# Patient Record
Sex: FEMALE | Race: WHITE | NOT HISPANIC OR LATINO | URBAN - METROPOLITAN AREA
[De-identification: names, ages, dates, MRNs, and addresses within clinical notes are randomized per-mention and may not be internally consistent; named-entity substitution may affect disease eponyms.]

---

## 2018-08-28 ENCOUNTER — OUTPATIENT (OUTPATIENT)
Dept: OUTPATIENT SERVICES | Facility: HOSPITAL | Age: 28
LOS: 1 days | Discharge: HOME | End: 2018-08-28

## 2018-09-04 DIAGNOSIS — B00.9 HERPESVIRAL INFECTION, UNSPECIFIED: ICD-10-CM

## 2023-01-23 PROBLEM — Z00.00 ENCOUNTER FOR PREVENTIVE HEALTH EXAMINATION: Status: ACTIVE | Noted: 2023-01-23

## 2023-01-24 ENCOUNTER — APPOINTMENT (OUTPATIENT)
Dept: OBGYN | Facility: CLINIC | Age: 33
End: 2023-01-24
Payer: COMMERCIAL

## 2023-01-24 VITALS
WEIGHT: 161 LBS | BODY MASS INDEX: 28.53 KG/M2 | SYSTOLIC BLOOD PRESSURE: 125 MMHG | HEIGHT: 63 IN | DIASTOLIC BLOOD PRESSURE: 76 MMHG

## 2023-01-24 DIAGNOSIS — Z78.9 OTHER SPECIFIED HEALTH STATUS: ICD-10-CM

## 2023-01-24 DIAGNOSIS — Z83.42 FAMILY HISTORY OF FAMILIAL HYPERCHOLESTEROLEMIA: ICD-10-CM

## 2023-01-24 PROCEDURE — 0501F PRENATAL FLOW SHEET: CPT

## 2023-01-24 PROCEDURE — 36415 COLL VENOUS BLD VENIPUNCTURE: CPT

## 2023-01-24 RX ORDER — ASCORBIC ACID, CHOLECALCIFEROL, .ALPHA.-TOCOPHEROL ACETATE, DL-, PYRIDOXINE, FOLIC ACID, CYANOCOBALAMIN, CALCIUM, FERROUS FUMARATE, MAGNESIUM, DOCONEXENT 85; 200; 10; 25; 1; 12; 140; 27; 45; 300 [IU]/1; [IU]/1; [IU]/1; [IU]/1; MG/1; UG/1; MG/1; MG/1; MG/1; MG/1
27-0.6-0.4-3 CAPSULE, GELATIN COATED ORAL
Qty: 30 | Refills: 8 | Status: ACTIVE | COMMUNITY
Start: 2023-01-24 | End: 1900-01-01

## 2023-01-24 NOTE — HISTORY OF PRESENT ILLNESS
[Currently Active] : currently active [Men] : men [No] : No [FreeTextEntry1] : Patient is a 32 year old who presents after she had a positive home pregnancy test. LMP 12/5/22. She is endorsing occasional nausea and breast tenderness. \par  [HIV Test offered] : HIV Test offered [Syphilis test offered] : Syphilis test offered [Gonorrhea test offered] : Gonorrhea test offered [Chlamydia test offered] : Chlamydia test offered [HPV test offered] : HPV test offered [Hepatitis B test offered] : Hepatitis B test offered [Hepatitis C test offered] : Hepatitis C test offered [N] : Patient denies prior pregnancies

## 2023-01-24 NOTE — PLAN
[FreeTextEntry1] : 32 year old female presenting with amenorrhea\par -f/u PAP and GC/CT done today\par -f/u prenatal blood work drawn today\par -Rx sent for PNV \par -Recommended pt to intake more mi as she c/o nausea \par -RTO 2 weeks for first trimester ultrasound and review of prenatal lab results

## 2023-01-25 LAB
ABO + RH PNL BLD: NORMAL
APPEARANCE: CLEAR
BACTERIA: NEGATIVE
BASOPHILS # BLD AUTO: 0.02 K/UL
BASOPHILS NFR BLD AUTO: 0.3 %
BILIRUBIN URINE: NEGATIVE
BLD GP AB SCN SERPL QL: NORMAL
BLOOD URINE: NEGATIVE
C TRACH RRNA SPEC QL NAA+PROBE: NOT DETECTED
COLOR: NORMAL
EOSINOPHIL # BLD AUTO: 0.02 K/UL
EOSINOPHIL NFR BLD AUTO: 0.3 %
ESTIMATED AVERAGE GLUCOSE: 97 MG/DL
GLUCOSE QUALITATIVE U: NEGATIVE
GLUCOSE SERPL-MCNC: 92 MG/DL
HBA1C MFR BLD HPLC: 5 %
HBV SURFACE AG SER QL: NONREACTIVE
HCT VFR BLD CALC: 38.3 %
HGB BLD-MCNC: 13 G/DL
HIV1+2 AB SPEC QL IA.RAPID: NONREACTIVE
HYALINE CASTS: 0 /LPF
IMM GRANULOCYTES NFR BLD AUTO: 0.2 %
KETONES URINE: NEGATIVE
LEUKOCYTE ESTERASE URINE: NEGATIVE
LYMPHOCYTES # BLD AUTO: 2.14 K/UL
LYMPHOCYTES NFR BLD AUTO: 36 %
MAN DIFF?: NORMAL
MCHC RBC-ENTMCNC: 31.1 PG
MCHC RBC-ENTMCNC: 33.9 GM/DL
MCV RBC AUTO: 91.6 FL
MICROSCOPIC-UA: NORMAL
MONOCYTES # BLD AUTO: 0.49 K/UL
MONOCYTES NFR BLD AUTO: 8.2 %
N GONORRHOEA RRNA SPEC QL NAA+PROBE: NOT DETECTED
NEUTROPHILS # BLD AUTO: 3.26 K/UL
NEUTROPHILS NFR BLD AUTO: 55 %
NITRITE URINE: NEGATIVE
PH URINE: 6.5
PLATELET # BLD AUTO: 250 K/UL
PROTEIN URINE: NEGATIVE
RBC # BLD: 4.18 M/UL
RBC # FLD: 12.1 %
RED BLOOD CELLS URINE: 2 /HPF
SOURCE AMPLIFICATION: NORMAL
SPECIFIC GRAVITY URINE: 1.01
SQUAMOUS EPITHELIAL CELLS: 1 /HPF
UROBILINOGEN URINE: NORMAL
WBC # FLD AUTO: 5.94 K/UL
WHITE BLOOD CELLS URINE: 1 /HPF

## 2023-01-26 LAB
BACTERIA UR CULT: NORMAL
HGB A MFR BLD: 96.9 %
HGB A2 MFR BLD: 2.6 %
HGB F MFR BLD: 0.5 %
HGB FRACT BLD-IMP: NORMAL
HPV 16 E6+E7 MRNA CVX QL NAA+PROBE: NOT DETECTED
HPV HIGH+LOW RISK DNA PNL CVX: NOT DETECTED
HPV18+45 E6+E7 MRNA CVX QL NAA+PROBE: NOT DETECTED
LEAD BLD-MCNC: <1 UG/DL
MEV IGG FLD QL IA: 54 AU/ML
MEV IGG+IGM SER-IMP: POSITIVE
MUV AB SER-ACNC: POSITIVE
MUV IGG SER QL IA: 81.6 AU/ML
RUBV IGG FLD-ACNC: 23.2 INDEX
RUBV IGG SER-IMP: POSITIVE
T PALLIDUM AB SER QL IA: NEGATIVE

## 2023-01-29 LAB
CYTOLOGY CVX/VAG DOC THIN PREP: NORMAL
M TB IFN-G BLD-IMP: NEGATIVE
QUANTIFERON TB PLUS MITOGEN MINUS NIL: 9.66 IU/ML
QUANTIFERON TB PLUS NIL: 0.09 IU/ML
QUANTIFERON TB PLUS TB1 MINUS NIL: -0.06 IU/ML
QUANTIFERON TB PLUS TB2 MINUS NIL: -0.05 IU/ML

## 2023-01-31 LAB
AR GENE MUT ANL BLD/T: NORMAL
FMR1 GENE MUT ANL BLD/T: NORMAL

## 2023-02-02 LAB — CFTR MUT TESTED BLD/T: NEGATIVE

## 2023-02-07 ENCOUNTER — APPOINTMENT (OUTPATIENT)
Dept: OBGYN | Facility: CLINIC | Age: 33
End: 2023-02-07
Payer: COMMERCIAL

## 2023-02-07 ENCOUNTER — APPOINTMENT (OUTPATIENT)
Dept: ANTEPARTUM | Facility: CLINIC | Age: 33
End: 2023-02-07
Payer: COMMERCIAL

## 2023-02-07 VITALS
HEIGHT: 63 IN | WEIGHT: 161 LBS | BODY MASS INDEX: 28.53 KG/M2 | SYSTOLIC BLOOD PRESSURE: 131 MMHG | DIASTOLIC BLOOD PRESSURE: 85 MMHG

## 2023-02-07 PROCEDURE — 76815 OB US LIMITED FETUS(S): CPT

## 2023-02-07 PROCEDURE — 0502F SUBSEQUENT PRENATAL CARE: CPT

## 2023-02-17 LAB
11-BETA-HYDROXYLASE-DEFICIENT CONGENITAL ADRENAL HYPERPLASIA: NEGATIVE
6-PYRUVOYL-TETRAHYDROPTERIN SYNTHASE DEFICIENCY: NEGATIVE
ABCC8-RELATED HYPERINSULINISM: NEGATIVE
ADENOSINE DEAMINASE DEFICIENCY: NEGATIVE
ALPHA THALASSEMIA: NEGATIVE
ALPHA-MANNOSIDOSIS: NEGATIVE
ALSTROM SYNDROME: NEGATIVE
AMT-RELATED GLYCINE ENCEPHALOPATHY: NEGATIVE
ANDERMANN SYNDROME: NEGATIVE
AR GENE MUT ANL BLD/T: NEGATIVE
ARGININEMIA: NEGATIVE
ARGININOSUCCINIC ACIDURIA: NEGATIVE
ARSACS: NEGATIVE
ASPARTYLGLYCOSAMINURIA: NEGATIVE
ATAXIA WITH VITAMIN E DEFICIENCY: NEGATIVE
ATAXIA-TELANGIECTASIA: NEGATIVE
ATP7A-RELATED DISORDERS: NEGATIVE
AUTOSOMAL RECESSIVE OSTEOPETROSIS TYPE 1: NEGATIVE
AUTOSOMAL RECESSIVE POLYCYSTIC KIDNEY DISEASE: NEGATIVE
BARDET-BIEDL SYNDROME, BBS1-RELATED: NEGATIVE
BARDET-BIEDL SYNDROME, BBS10-RELATED: NEGATIVE
BARDET-BIEDL SYNDROME, BBS12-RELATED: NEGATIVE
BARDET-BIEDL SYNDROME, BBS2-RELATED: NEGATIVE
BIOTINIDASE DEFICIENCY: NEGATIVE
BLOOM SYNDROME: NEGATIVE
CALPAINOPATHY: NEGATIVE
CANAVAN DISEASE: NEGATIVE
CARBAMOYLPHOSPHATE SYNTHETASE I DEFICIENCY: NEGATIVE
CARNITINE PALMITOYLTRANSFERASE IA DEFICIENCY: NEGATIVE
CARNITINE PALMITOYLTRANSFERASE II DEFICIENCY: NEGATIVE
CARTILAGE-HAIR HYPOPLASIA: NEGATIVE
CEREBROTENDINOUS XANTHOMATOSIS: NEGATIVE
CITRULLINEMIA TYPE 1: NEGATIVE
CLN3-RELATED NEURONAL CEROID LIPOFUSCINOSIS: NEGATIVE
CLN5-RELATED NEURONAL CEROID LIPOFUSCINOSIS: NEGATIVE
CLN6-RELATED NEURONAL CEROID LIPOFUSCINOSIS: NEGATIVE
COHEN SYNDROME: NEGATIVE
COL4A3-RELATED ALPORT SYNDROME: NEGATIVE
COL4A4-RELATED ALPORT SYNDROME: NEGATIVE
CONGENITAL ADRENAL HYPERPLASIA: NEGATIVE
CONGENITAL DISORDER OF GLYCOSYLATION TYPE IA: NEGATIVE
CONGENITAL DISORDER OF GLYCOSYLATION TYPE IB: NEGATIVE
CONGENITAL DISORDER OF GLYCOSYLATION TYPE IC: NEGATIVE
CONGENITAL FINNISH NEPHROSIS: NEGATIVE
COSTEFF OPTIC ATROPHY SYNDROME: NEGATIVE
CYSTIC FIBROSIS: NEGATIVE
CYSTINOSIS: NEGATIVE
D-BIFUNCTIONAL PROTEIN DEFICIENCY: NEGATIVE
DELTA-SARCOGLYCANOPATHY: NEGATIVE
DYS GENE MUT TESTED BLD/T: NEGATIVE
DYSFERLINOPATHY: NEGATIVE
DYSTROPHINOPATHY (INCLUDING DUCHENNE/BECKER MUSCULAR DYSTROP: NEGATIVE
ERCC6-RELATED DISORDERS: NEGATIVE
ERCC8-RELATED DISORDERS: NEGATIVE
EVC-RELATED ELLIS-VAN CREVELD SYNDROME: NEGATIVE
EVC2-RELATED ELLIS-VAN CREVELD SYNDROME: NEGATIVE
FABRY DISEASE: NEGATIVE
FAMILIAL MEDITERRANEAN FEVER: NEGATIVE
FANCONI ANEMIA COMPLEMENTATION GROUP A: NEGATIVE
FANCONI ANEMIA TYPE C: POSITIVE
FKRP-RELATED DISORDERS: NEGATIVE
GALACTOKINASE DEFICIENCY: NEGATIVE
GALACTOSEMIA: NEGATIVE
GAMMA-SARCOGLYCANOPATHY: NEGATIVE
GAUCHER DISEASE: NEGATIVE
GJB2-RELATED DFNB1 NONSYNDROMIC HEARING LOSS AND DEAFNESS: NEGATIVE
GLB1-RELATED DISORDERS: NEGATIVE
GLDC-RELATED GLYCINE ENCEPHALOPATHY: NEGATIVE
GLUTARIC ACIDEMIA TYPE 1: NEGATIVE
GLYCOGEN STORAGE DISEASE TYPE IA: NEGATIVE
GLYCOGEN STORAGE DISEASE TYPE IB: NEGATIVE
GLYCOGEN STORAGE DISEASE TYPE III: NEGATIVE
GNPTAB-RELATED DISORDERS: NEGATIVE
GRACILE SYNDROME: NEGATIVE
HB BETA CHAIN-RELATED HEMOGLOBINOPATHY: NEGATIVE
HEREDITARY FRUCTOSE INTOLERANCE: NEGATIVE
HERLITZ JUNCTIONAL EPIDERMOLYSIS BULLOSA, LAMA3-RELATED: NEGATIVE
HERLITZ JUNCTIONAL EPIDERMOLYSIS BULLOSA, LAMB3-RELATED: NEGATIVE
HERLITZ JUNCTIONAL EPIDERMOLYSIS BULLOSA, LAMC2-RELATED: NEGATIVE
HEXOSAMINIDASE A DEFICIENCY: NEGATIVE
HMG-COA LYASE DEFICIENCY: NEGATIVE
HOLOCARBOXYLASE SYNTHETASE DEFICIENCY: NEGATIVE
HOMOCYSTINURIA / CYSTATHIONINE BETA-SYNTHASE DEFICIENCY: NEGATIVE
HURLER SYNDROME: NEGATIVE
HYDROLETHALUS SYNDROME: NEGATIVE
HYPOPHOSPHATASIA, AUTOSOMAL RECESSIVE: NEGATIVE
INCLUSION BODY MYOPATHY 2: NEGATIVE
ISOVALERIC ACIDEMIA: NEGATIVE
JOUBERT SYNDROME 2: NEGATIVE
KCNJ11-RELATED FAMILIAL HYPERINSULINISM: NEGATIVE
KRABBE DISEASE: NEGATIVE
LAMA2-RELATED MUSCULAR DYSTROPHY: NEGATIVE
LEIGH SYNDROME, FRENCH-CANADIAN TYPE: NEGATIVE
LIMB-GIRDLE MUSCULAR DYSTROPHY TYPE 2D: NEGATIVE
LIMB-GIRDLE MUSCULAR DYSTROPHY TYPE 2E: NEGATIVE
LIPOAMIDE DEHYDROGENASE DEFICIENCY: NEGATIVE
LIPOID CONGENITAL ADRENAL HYPERPLASIA: NEGATIVE
LONG CHAIN 3-HYDROXYACYL-COA DEHYDROGENASE DEFICIENCY: NEGATIVE
LYSOSOMAL ACID LIPASE DEFICIENCY: NEGATIVE
MAPLE SYRUP URINE DISEASE TYPE 1B: NEGATIVE
MAPLE SYRUP URINE DISEASE TYPE IA: NEGATIVE
MAPLE SYRUP URINE DISEASE TYPE II: NEGATIVE
MEDIUM CHAIN ACYL-COA DEHYDROGENASE DEFICIENCY: NEGATIVE
MEGALENCEPHALIC LEUKOENCEPHALOPATHY WITH SUBCORTICAL CYSTS: NEGATIVE
METACHROMATIC LEUKODYSTROPHY: NEGATIVE
METHYLMALONIC ACIDEMIA, CBLA TYPE: NEGATIVE
METHYLMALONIC ACIDEMIA, CBLB TYPE: NEGATIVE
METHYLMALONIC ACIDURIA AND HOMOCYSTINURIA, CBLC TYPE: NEGATIVE
MKS1-RELATED DISORDERS: NEGATIVE
MUCOLIPIDOSIS III GAMMA: NEGATIVE
MUCOLIPIDOSIS IV: NEGATIVE
MUCOPOLYSACCHARIDOSIS TYPE II: NEGATIVE
MUCOPOLYSACCHARIDOSIS TYPE IIIA: NEGATIVE
MUCOPOLYSACCHARIDOSIS TYPE IIIB: NEGATIVE
MUCOPOLYSACCHARIDOSIS TYPE IIIC: NEGATIVE
MUSCLE-EYE-BRAIN DISEASE: NEGATIVE
MUT-RELATED METHYLMALONIC ACIDEMIA: NEGATIVE
MYO7A-RELATED DISORDERS: NEGATIVE
NEB-RELATED NEMALINE MYOPATHY: NEGATIVE
NIEMANN-PICK DISEASE TYPE C2: NEGATIVE
NIEMANN-PICK DISEASE TYPE C: NEGATIVE
NIEMANN-PICK DISEASE, SMPD1-ASSOCIATED: NEGATIVE
NIJMEGEN BREAKAGE SYNDROME: NEGATIVE
NORTHERN EPILEPSY: NEGATIVE
ORNITHINE TRANSCARBAMYLASE DEFICIENCY: NEGATIVE
PCCA-RELATED PROPIONIC ACIDEMIA: NEGATIVE
PCCB-RELATED PROPIONIC ACIDEMIA: NEGATIVE
PENDRED SYNDROME: NEGATIVE
PEROXISOME BIOGENESIS DISORDER TYPE 3: NEGATIVE
PEROXISOME BIOGENESIS DISORDER TYPE 4: NEGATIVE
PEROXISOME BIOGENESIS DISORDER TYPE 5: NEGATIVE
PEROXISOME BIOGENESIS DISORDER TYPE 6: NEGATIVE
PEX1-RELATED ZELLWEGER SYNDROME SPECTRUM: NEGATIVE
PHENYLALANINE HYDROXYLASE DEFICIENCY: NEGATIVE
POLYGLANDULAR AUTOIMMUNE SYNDROME TYPE 1: NEGATIVE
POMPE DISEASE: NEGATIVE
PPT1-RELATED NEURONAL CEROID LIPOFUSCINOSIS: NEGATIVE
PRIMARY CARNITINE DEFICIENCY: NEGATIVE
PRIMARY HYPEROXALURIA TYPE 1: NEGATIVE
PRIMARY HYPEROXALURIA TYPE 2: NEGATIVE
PRIMARY HYPEROXALURIA TYPE 3: NEGATIVE
PROP1-RELATED COMBINED PITUITARY HORMONE DEFICIENCY: NEGATIVE
PYCNODYSOSTOSIS: NEGATIVE
PYRUVATE CARBOXYLASE DEFICIENCY: NEGATIVE
RHIZOMELIC CHONDRODYSPLASIA PUNCTATA TYPE 1: NEGATIVE
RTEL1-RELATED DISORDERS: NEGATIVE
SALLA DISEASE: NEGATIVE
SANDHOFF DISEASE: NEGATIVE
SEGAWA SYNDROME: NEGATIVE
SHORT CHAIN ACYL-COA DEHYDROGENASE DEFICIENCY: NEGATIVE
SJOGREN-LARSSON SYNDROME: NEGATIVE
SMITH-LEMLI-OPITZ SYNDROME: NEGATIVE
SPASTIC PARAPLEGIA TYPE 15: NEGATIVE
SPONDYLOTHORACIC DYSOSTOSIS: NEGATIVE
STEROID-RESISTANT NEPHROTIC SYNDROME: NEGATIVE
SULFATE TRANSPORTER-RELATED OSTEOCHONDRODYSPLASIA: NEGATIVE
TGM1-RELATED AUTOSOMAL RECESSIVE CONGENITAL ICHTHYOSIS: NEGATIVE
TPP1-RELATED NEURONAL CEROID LIPOFUSCINOSIS: NEGATIVE
TYROSINEMIA TYPE I: NEGATIVE
TYROSINEMIA TYPE II: NEGATIVE
USH1C-RELATED DISORDERS: NEGATIVE
USH2A-RELATED DISORDERS: NEGATIVE
USHER SYNDROME TYPE 1F: NEGATIVE
USHER SYNDROME TYPE 3: NEGATIVE
VERY LONG CHAIN ACYL-COA DEHYDROGENASE DEFICIENCY: NEGATIVE
WALKER-WARBURG SYNDROME: NEGATIVE
WILSON DISEASE: NEGATIVE
X-LINKED ADRENOLEUKODYSTROPHY: NEGATIVE
X-LINKED ALPORT SYNDROME: NEGATIVE
X-LINKED CONGENITAL ADRENAL HYPOPLASIA: NEGATIVE
X-LINKED JUVENILE RETINOSCHISIS: NEGATIVE
X-LINKED MYOTUBULAR MYOPATHY: NEGATIVE
X-LINKED SEVERE COMBINED IMMUNODEFICIENCY: NEGATIVE
XERODERMA PIGMENTOSUM GROUP A: NEGATIVE
XERODERMA PIGMENTOSUM GROUP C: NEGATIVE

## 2023-03-01 ENCOUNTER — APPOINTMENT (OUTPATIENT)
Dept: OBGYN | Facility: CLINIC | Age: 33
End: 2023-03-01
Payer: COMMERCIAL

## 2023-03-01 ENCOUNTER — APPOINTMENT (OUTPATIENT)
Dept: ANTEPARTUM | Facility: CLINIC | Age: 33
End: 2023-03-01
Payer: COMMERCIAL

## 2023-03-01 VITALS — WEIGHT: 162 LBS | BODY MASS INDEX: 28.7 KG/M2 | DIASTOLIC BLOOD PRESSURE: 86 MMHG | SYSTOLIC BLOOD PRESSURE: 136 MMHG

## 2023-03-01 DIAGNOSIS — Z33.1 PREGNANT STATE, INCIDENTAL: ICD-10-CM

## 2023-03-01 PROCEDURE — 76813 OB US NUCHAL MEAS 1 GEST: CPT

## 2023-03-01 PROCEDURE — 0502F SUBSEQUENT PRENATAL CARE: CPT

## 2023-03-01 PROCEDURE — 36415 COLL VENOUS BLD VENIPUNCTURE: CPT

## 2023-03-06 LAB
ADDITIONAL US: NORMAL
CRL SCAN TWIN B: NORMAL
CRL SCAN: NORMAL
CROWN RUMP LENGTH TWIN B: NORMAL
CROWN RUMP LENGTH: 59.5 MM
DIA MOM: 1.62
DIA VALUE: 362.9 PG/ML
DOWN SYNDROME AGE RISK: NORMAL
DOWN SYNDROME INTERPRETATION: NORMAL
DOWN SYNDROME SCREENING RISK: NORMAL
FIRST TRIMESTER SCREEN COMMENTS: NORMAL
FIRST TRIMESTER SCREEN NOTE: NORMAL
FIRST TRIMESTER SCREEN RESULTS: NORMAL
FIRST TRIMESTER SCREEN TEST RESULTS: NORMAL
GEST. AGE ON COLLECTION DATE: 12.3 WEEKS
HCG MOM: 1.11
HCG VALUE: 106.6 IU/ML
MATERNAL AGE AT EDD: 33.5 YR
NT MOM TWIN B: NORMAL
NT TWIN B: NORMAL
NUCHAL TRANSLUCENCY (NT): 1.4 MM
NUCHAL TRANSLUCENCY MOM: 0.91
NUMBER OF FETUSES: 1
PAPP-A MOM: 1.85
PAPP-A VALUE: 1496.7 NG/ML
RACE: NORMAL
SONOGRAPHER ID#: NORMAL
TRISOMY 18 AGE RISK: NORMAL
TRISOMY 18 INTERPRETATION: NORMAL
TRISOMY 18 SCREENING RISK: NORMAL
WEIGHT AFP: 162 LBS

## 2023-03-16 ENCOUNTER — NON-APPOINTMENT (OUTPATIENT)
Age: 33
End: 2023-03-16

## 2023-03-22 ENCOUNTER — NON-APPOINTMENT (OUTPATIENT)
Age: 33
End: 2023-03-22

## 2023-03-28 ENCOUNTER — APPOINTMENT (OUTPATIENT)
Dept: OBGYN | Facility: CLINIC | Age: 33
End: 2023-03-28
Payer: COMMERCIAL

## 2023-03-28 VITALS
SYSTOLIC BLOOD PRESSURE: 121 MMHG | BODY MASS INDEX: 28.88 KG/M2 | WEIGHT: 163 LBS | HEIGHT: 63 IN | DIASTOLIC BLOOD PRESSURE: 80 MMHG

## 2023-03-28 PROCEDURE — 0502F SUBSEQUENT PRENATAL CARE: CPT

## 2023-03-28 PROCEDURE — 36415 COLL VENOUS BLD VENIPUNCTURE: CPT

## 2023-04-01 LAB
AFP MOM: 0.73
AFP VALUE: 24.2 NG/ML
ALPHA FETOPROTEIN SERUM COMMENT: NORMAL
ALPHA FETOPROTEIN SERUM INTERPRETATION: NORMAL
ALPHA FETOPROTEIN SERUM RESULTS: NORMAL
ALPHA FETOPROTEIN SERUM TEST RESULTS: NORMAL
GESTATIONAL AGE BASED ON: NORMAL
GESTATIONAL AGE ON COLLECTION DATE: 16.1 WEEKS
INSULIN DEP DIABETES: NO
MATERNAL AGE AT EDD AFP: 33.4 YR
MULTIPLE GESTATION: NO
OSBR RISK 1 IN: NORMAL
RACE: NORMAL
WEIGHT AFP: 163 LBS

## 2023-04-26 ENCOUNTER — APPOINTMENT (OUTPATIENT)
Dept: ANTEPARTUM | Facility: CLINIC | Age: 33
End: 2023-04-26
Payer: COMMERCIAL

## 2023-04-26 ENCOUNTER — APPOINTMENT (OUTPATIENT)
Dept: OBGYN | Facility: CLINIC | Age: 33
End: 2023-04-26
Payer: COMMERCIAL

## 2023-04-26 VITALS — DIASTOLIC BLOOD PRESSURE: 72 MMHG | WEIGHT: 169 LBS | SYSTOLIC BLOOD PRESSURE: 112 MMHG | BODY MASS INDEX: 29.94 KG/M2

## 2023-04-26 PROCEDURE — 76811 OB US DETAILED SNGL FETUS: CPT

## 2023-04-26 PROCEDURE — ZZZZZ: CPT

## 2023-05-23 ENCOUNTER — APPOINTMENT (OUTPATIENT)
Dept: OBGYN | Facility: CLINIC | Age: 33
End: 2023-05-23
Payer: COMMERCIAL

## 2023-05-23 VITALS
BODY MASS INDEX: 31.54 KG/M2 | DIASTOLIC BLOOD PRESSURE: 81 MMHG | HEIGHT: 63 IN | WEIGHT: 178 LBS | SYSTOLIC BLOOD PRESSURE: 133 MMHG

## 2023-05-23 DIAGNOSIS — K64.9 UNSPECIFIED HEMORRHOIDS: ICD-10-CM

## 2023-05-23 PROCEDURE — 0502F SUBSEQUENT PRENATAL CARE: CPT

## 2023-05-25 ENCOUNTER — RX RENEWAL (OUTPATIENT)
Age: 33
End: 2023-05-25

## 2023-05-25 LAB — GLUCOSE 1H P 50 G GLC PO SERPL-MCNC: 108 MG/DL

## 2023-06-20 ENCOUNTER — APPOINTMENT (OUTPATIENT)
Dept: OBGYN | Facility: CLINIC | Age: 33
End: 2023-06-20
Payer: COMMERCIAL

## 2023-06-20 VITALS
BODY MASS INDEX: 31.41 KG/M2 | HEIGHT: 64 IN | WEIGHT: 184 LBS | DIASTOLIC BLOOD PRESSURE: 76 MMHG | SYSTOLIC BLOOD PRESSURE: 122 MMHG

## 2023-06-20 PROCEDURE — 0502F SUBSEQUENT PRENATAL CARE: CPT

## 2023-06-20 PROCEDURE — 0501F PRENATAL FLOW SHEET: CPT

## 2023-06-29 ENCOUNTER — NON-APPOINTMENT (OUTPATIENT)
Age: 33
End: 2023-06-29

## 2023-07-01 ENCOUNTER — EMERGENCY (EMERGENCY)
Facility: HOSPITAL | Age: 33
LOS: 1 days | Discharge: HOME | End: 2023-07-01
Attending: EMERGENCY MEDICINE | Admitting: EMERGENCY MEDICINE
Payer: COMMERCIAL

## 2023-07-01 VITALS
RESPIRATION RATE: 18 BRPM | DIASTOLIC BLOOD PRESSURE: 79 MMHG | HEART RATE: 82 BPM | OXYGEN SATURATION: 98 % | SYSTOLIC BLOOD PRESSURE: 127 MMHG | TEMPERATURE: 98 F

## 2023-07-01 PROCEDURE — 99284 EMERGENCY DEPT VISIT MOD MDM: CPT

## 2023-07-01 PROCEDURE — 93010 ELECTROCARDIOGRAM REPORT: CPT

## 2023-07-01 NOTE — ED ADULT NURSE NOTE - OBJECTIVE STATEMENT
pt to ED c/o R sided shoulder shoulder/back pain with associated R arm numbness. pt denies trauma/injury to the area, heavy lifting, weakness to R side. pt with +/= radial pulses, strength, ROM bilaterally. pt is 30 weeks pregnant. pt offers no further acute medical complaints at this time. denies chest pain, respirations even and nonlabored on RA. appears uncomfortable/in pain.

## 2023-07-01 NOTE — ED ADULT NURSE NOTE - NSFALLUNIVINTERV_ED_ALL_ED
Bed/Stretcher in lowest position, wheels locked, appropriate side rails in place/Call bell, personal items and telephone in reach/Instruct patient to call for assistance before getting out of bed/chair/stretcher/Non-slip footwear applied when patient is off stretcher/[place] to call system/Physically safe environment - no spills, clutter or unnecessary equipment/Purposeful proactive rounding/Room/bathroom lighting operational, light cord in reach

## 2023-07-01 NOTE — ED ADULT TRIAGE NOTE - CHIEF COMPLAINT QUOTE
C/o of shoulder blade pain radiating down to her right hand with numbness x3 days. No known injury. Denies any SOB, chest pain. Patient 30 weeks pregnant,  . Patient of Dr. Teodoro Avalos. Respirations even and nonlabored.

## 2023-07-02 RX ORDER — LIDOCAINE 4 G/100G
1 CREAM TOPICAL ONCE
Refills: 0 | Status: COMPLETED | OUTPATIENT
Start: 2023-07-02 | End: 2023-07-02

## 2023-07-02 RX ORDER — ACETAMINOPHEN 500 MG
325 TABLET ORAL ONCE
Refills: 0 | Status: COMPLETED | OUTPATIENT
Start: 2023-07-02 | End: 2023-07-02

## 2023-07-02 RX ADMIN — Medication 325 MILLIGRAM(S): at 00:14

## 2023-07-02 RX ADMIN — LIDOCAINE 1 PATCH: 4 CREAM TOPICAL at 00:14

## 2023-07-02 NOTE — ED PROVIDER NOTE - PHYSICAL EXAMINATION
PHYSICAL EXAM:  GENERAL: Sitting comfortable in bed, in no acute distress  HENMT: Atraumatic, moist mucous membranes  EYES: Clear bilaterally, PERRL, EOMs intact b/l  HEART: RRR, S1/S2, no murmur  RESPIRATORY: Clear to auscultation bilaterally, no wheezes/rhonchi/rales  ABDOMEN: Soft, nontender, gravid  MSK: No scapular ttp. No spinal or paraspinal ttp. Right with normal rom  EXTREMITIES: No lower extremity edema  NEURO: A&Ox4, no focal motor deficits or sensory deficits   SKIN: Skin normal color for race, warm, dry and intact

## 2023-07-02 NOTE — ED PROVIDER NOTE - ATTENDING CONTRIBUTION TO CARE
34 y/o F  at 30 weeks GA here with back pain.  Pt reports pain to R upper back, around region of scapula, radiating down her R arm associated with tingling.  Denies preceding trauma or injury, states she woke up a few days ago with this pain.  Pain is worse with certain movements.  No associated fever, sob, cp, abd pain, n/v, leg pain or swelling.  Pt reports normal pregnancy course.  Normal fetal movement and no vaginal bleeding.  Of note, seen initially at OB triage with normal fetal eval.    Well appearing, lying comfortably in stretcher, awake and alert, nontoxic.  AF/VSS.  NCAT neck is supple, no midline spinal tenderness or palpable deformity.  (+)TTP along R upper thoracic paraspinal muscles and around R scapular region.  There is no overlying skin changes.  RUE NVI, no swelling or rash.  Lungs cta bl.  Cards nl S1/S2, RRR, no MRG.  Abd soft ntnd (+)gravid, nontender.  No pedal edema or calf tenderness.    Likely msk pain with peripheral neuropathy, no e/o fracture or dislocation.  Do not suspect CNS etiology.  No e/o infection.  Do not suspect PE (no resp sxs, no pleuritic sxs).  Plan for pain control, reassess.

## 2023-07-02 NOTE — ED PROVIDER NOTE - OBJECTIVE STATEMENT
33F  at 30 wks p/w right scapular pain and tingling down her right arm. Says the symptoms started yesterday, initially resolved with tylenol. No weakness, CP, SOB, contractions, leakage of fluid, vaginal bleeding, decreased fetal movement.

## 2023-07-02 NOTE — ED PROVIDER NOTE - NSFOLLOWUPINSTRUCTIONS_ED_ALL_ED_FT
You have been evaluated in the Emergency Department today for back pain. Your evaluation did not show evidence of medical conditions requiring emergent intervention at this time.    Please schedule an appointment with your primary care physician, within 48-72 hours if possible.    Please also follow-up with your Ob/Gyn as planned.    For pain, You can take TYLENOL/ACETAMINOPHEN up to 4,000mg a day for your symptoms in four divided doses.     Return to the Emergency Department if you experience worsening or uncontrolled pain, fevers 100.4°F or greater, contractions, vaginal bleeding, leakage of fluids, decreased fetal movement, or any other concerning symptoms.    Thank you for choosing us for your care.

## 2023-07-02 NOTE — ED PROVIDER NOTE - PATIENT PORTAL LINK FT
You can access the FollowMyHealth Patient Portal offered by [facility] by registering at the following website: [email or web address]. By joining [facility]’s FollowMyHealth portal, you will also be able to view your health information using other applications (apps) compatible with our system.

## 2023-07-02 NOTE — ED PROVIDER NOTE - CLINICAL SUMMARY MEDICAL DECISION MAKING FREE TEXT BOX
33F  at 30 wks p/w right scapular pain and tingling down her right arm. Likely neuropathy vs msk. Plan: pain control. Will re-assess. Likely dc.

## 2023-07-05 ENCOUNTER — APPOINTMENT (OUTPATIENT)
Dept: OBGYN | Facility: CLINIC | Age: 33
End: 2023-07-05
Payer: COMMERCIAL

## 2023-07-05 ENCOUNTER — APPOINTMENT (OUTPATIENT)
Dept: ANTEPARTUM | Facility: CLINIC | Age: 33
End: 2023-07-05
Payer: COMMERCIAL

## 2023-07-05 VITALS
BODY MASS INDEX: 32.12 KG/M2 | DIASTOLIC BLOOD PRESSURE: 76 MMHG | WEIGHT: 187.13 LBS | SYSTOLIC BLOOD PRESSURE: 119 MMHG | HEART RATE: 94 BPM

## 2023-07-05 PROCEDURE — 76816 OB US FOLLOW-UP PER FETUS: CPT

## 2023-07-05 PROCEDURE — 0502F SUBSEQUENT PRENATAL CARE: CPT

## 2023-07-05 PROCEDURE — 76819 FETAL BIOPHYS PROFIL W/O NST: CPT

## 2023-07-18 ENCOUNTER — RX RENEWAL (OUTPATIENT)
Age: 33
End: 2023-07-18

## 2023-07-19 ENCOUNTER — RX RENEWAL (OUTPATIENT)
Age: 33
End: 2023-07-19

## 2023-07-20 ENCOUNTER — APPOINTMENT (OUTPATIENT)
Dept: OBGYN | Facility: CLINIC | Age: 33
End: 2023-07-20
Payer: COMMERCIAL

## 2023-07-20 ENCOUNTER — NON-APPOINTMENT (OUTPATIENT)
Age: 33
End: 2023-07-20

## 2023-07-20 VITALS
DIASTOLIC BLOOD PRESSURE: 91 MMHG | BODY MASS INDEX: 32.27 KG/M2 | WEIGHT: 189 LBS | HEIGHT: 64 IN | SYSTOLIC BLOOD PRESSURE: 136 MMHG

## 2023-07-20 VITALS — SYSTOLIC BLOOD PRESSURE: 122 MMHG | DIASTOLIC BLOOD PRESSURE: 80 MMHG

## 2023-07-20 PROCEDURE — 0502F SUBSEQUENT PRENATAL CARE: CPT

## 2023-08-02 ENCOUNTER — APPOINTMENT (OUTPATIENT)
Dept: OBGYN | Facility: CLINIC | Age: 33
End: 2023-08-02
Payer: COMMERCIAL

## 2023-08-02 VITALS
HEIGHT: 64 IN | BODY MASS INDEX: 32.95 KG/M2 | SYSTOLIC BLOOD PRESSURE: 111 MMHG | DIASTOLIC BLOOD PRESSURE: 73 MMHG | WEIGHT: 193 LBS

## 2023-08-02 PROCEDURE — 0502F SUBSEQUENT PRENATAL CARE: CPT

## 2023-08-16 ENCOUNTER — APPOINTMENT (OUTPATIENT)
Dept: OBGYN | Facility: CLINIC | Age: 33
End: 2023-08-16
Payer: COMMERCIAL

## 2023-08-16 VITALS
BODY MASS INDEX: 33.46 KG/M2 | WEIGHT: 196 LBS | SYSTOLIC BLOOD PRESSURE: 128 MMHG | DIASTOLIC BLOOD PRESSURE: 84 MMHG | HEIGHT: 64 IN

## 2023-08-16 DIAGNOSIS — Z34.90 ENCOUNTER FOR SUPERVISION OF NORMAL PREGNANCY, UNSPECIFIED, UNSPECIFIED TRIMESTER: ICD-10-CM

## 2023-08-16 PROCEDURE — 36415 COLL VENOUS BLD VENIPUNCTURE: CPT

## 2023-08-16 PROCEDURE — 0502F SUBSEQUENT PRENATAL CARE: CPT

## 2023-08-18 LAB — HIV1+2 AB SPEC QL IA.RAPID: NONREACTIVE

## 2023-08-18 RX ADMIN — SODIUM CHLORIDE 3 MILLILITER(S): 9 INJECTION INTRAMUSCULAR; INTRAVENOUS; SUBCUTANEOUS at 22:00

## 2023-08-22 ENCOUNTER — NON-APPOINTMENT (OUTPATIENT)
Age: 33
End: 2023-08-22

## 2023-08-22 LAB — B-HEM STREP SPEC QL CULT: NORMAL

## 2023-08-23 ENCOUNTER — APPOINTMENT (OUTPATIENT)
Dept: OBGYN | Facility: CLINIC | Age: 33
End: 2023-08-23
Payer: COMMERCIAL

## 2023-08-23 ENCOUNTER — INPATIENT (INPATIENT)
Facility: HOSPITAL | Age: 33
LOS: 2 days | Discharge: HOME | End: 2023-08-26
Attending: OBSTETRICS & GYNECOLOGY | Admitting: OBSTETRICS & GYNECOLOGY
Payer: COMMERCIAL

## 2023-08-23 VITALS — SYSTOLIC BLOOD PRESSURE: 125 MMHG | DIASTOLIC BLOOD PRESSURE: 76 MMHG | HEART RATE: 82 BPM

## 2023-08-23 DIAGNOSIS — O26.899 OTHER SPECIFIED PREGNANCY RELATED CONDITIONS, UNSPECIFIED TRIMESTER: ICD-10-CM

## 2023-08-23 LAB
ALBUMIN SERPL ELPH-MCNC: 3.5 G/DL — SIGNIFICANT CHANGE UP (ref 3.3–5)
ALP SERPL-CCNC: 135 U/L — HIGH (ref 40–120)
ALT FLD-CCNC: 14 U/L — SIGNIFICANT CHANGE UP (ref 4–33)
ANION GAP SERPL CALC-SCNC: 12 MMOL/L — SIGNIFICANT CHANGE UP (ref 7–14)
APPEARANCE UR: CLEAR — SIGNIFICANT CHANGE UP
APTT BLD: 25.3 SEC — SIGNIFICANT CHANGE UP (ref 24.5–35.6)
AST SERPL-CCNC: 19 U/L — SIGNIFICANT CHANGE UP (ref 4–32)
BASOPHILS # BLD AUTO: 0.01 K/UL — SIGNIFICANT CHANGE UP (ref 0–0.2)
BASOPHILS NFR BLD AUTO: 0.1 % — SIGNIFICANT CHANGE UP (ref 0–2)
BILIRUB SERPL-MCNC: <0.2 MG/DL — SIGNIFICANT CHANGE UP (ref 0.2–1.2)
BILIRUB UR-MCNC: NEGATIVE — SIGNIFICANT CHANGE UP
BUN SERPL-MCNC: 4 MG/DL — LOW (ref 7–23)
CALCIUM SERPL-MCNC: 8.5 MG/DL — SIGNIFICANT CHANGE UP (ref 8.4–10.5)
CHLORIDE SERPL-SCNC: 105 MMOL/L — SIGNIFICANT CHANGE UP (ref 98–107)
CO2 SERPL-SCNC: 20 MMOL/L — LOW (ref 22–31)
COLOR SPEC: YELLOW — SIGNIFICANT CHANGE UP
CREAT ?TM UR-MCNC: 9 MG/DL — SIGNIFICANT CHANGE UP
CREAT ?TM UR-MCNC: 9 MG/DL — SIGNIFICANT CHANGE UP
CREAT SERPL-MCNC: 0.5 MG/DL — SIGNIFICANT CHANGE UP (ref 0.5–1.3)
DIFF PNL FLD: NEGATIVE — SIGNIFICANT CHANGE UP
EGFR: 127 ML/MIN/1.73M2 — SIGNIFICANT CHANGE UP
EOSINOPHIL # BLD AUTO: 0.1 K/UL — SIGNIFICANT CHANGE UP (ref 0–0.5)
EOSINOPHIL NFR BLD AUTO: 1.4 % — SIGNIFICANT CHANGE UP (ref 0–6)
FIBRINOGEN PPP-MCNC: 483 MG/DL — HIGH (ref 200–465)
GLUCOSE SERPL-MCNC: 99 MG/DL — SIGNIFICANT CHANGE UP (ref 70–99)
GLUCOSE UR QL: NEGATIVE MG/DL — SIGNIFICANT CHANGE UP
HCT VFR BLD CALC: 30.8 % — LOW (ref 34.5–45)
HGB BLD-MCNC: 10 G/DL — LOW (ref 11.5–15.5)
IANC: 4.28 K/UL — SIGNIFICANT CHANGE UP (ref 1.8–7.4)
IMM GRANULOCYTES NFR BLD AUTO: 0.7 % — SIGNIFICANT CHANGE UP (ref 0–0.9)
INR BLD: <0.9 RATIO — SIGNIFICANT CHANGE UP (ref 0.85–1.18)
KETONES UR-MCNC: NEGATIVE MG/DL — SIGNIFICANT CHANGE UP
LDH SERPL L TO P-CCNC: 235 U/L — HIGH (ref 135–225)
LEUKOCYTE ESTERASE UR-ACNC: NEGATIVE — SIGNIFICANT CHANGE UP
LYMPHOCYTES # BLD AUTO: 2.06 K/UL — SIGNIFICANT CHANGE UP (ref 1–3.3)
LYMPHOCYTES # BLD AUTO: 28.6 % — SIGNIFICANT CHANGE UP (ref 13–44)
MCHC RBC-ENTMCNC: 28.7 PG — SIGNIFICANT CHANGE UP (ref 27–34)
MCHC RBC-ENTMCNC: 32.5 GM/DL — SIGNIFICANT CHANGE UP (ref 32–36)
MCV RBC AUTO: 88.3 FL — SIGNIFICANT CHANGE UP (ref 80–100)
MONOCYTES # BLD AUTO: 0.7 K/UL — SIGNIFICANT CHANGE UP (ref 0–0.9)
MONOCYTES NFR BLD AUTO: 9.7 % — SIGNIFICANT CHANGE UP (ref 2–14)
NEUTROPHILS # BLD AUTO: 4.28 K/UL — SIGNIFICANT CHANGE UP (ref 1.8–7.4)
NEUTROPHILS NFR BLD AUTO: 59.5 % — SIGNIFICANT CHANGE UP (ref 43–77)
NITRITE UR-MCNC: NEGATIVE — SIGNIFICANT CHANGE UP
NRBC # BLD: 0 /100 WBCS — SIGNIFICANT CHANGE UP (ref 0–0)
NRBC # FLD: 0 K/UL — SIGNIFICANT CHANGE UP (ref 0–0)
PH UR: 7 — SIGNIFICANT CHANGE UP (ref 5–8)
PLATELET # BLD AUTO: 268 K/UL — SIGNIFICANT CHANGE UP (ref 150–400)
POTASSIUM SERPL-MCNC: 3.9 MMOL/L — SIGNIFICANT CHANGE UP (ref 3.5–5.3)
POTASSIUM SERPL-SCNC: 3.9 MMOL/L — SIGNIFICANT CHANGE UP (ref 3.5–5.3)
PROT ?TM UR-MCNC: <4 MG/DL — SIGNIFICANT CHANGE UP
PROT SERPL-MCNC: 6.5 G/DL — SIGNIFICANT CHANGE UP (ref 6–8.3)
PROT UR-MCNC: NEGATIVE MG/DL — SIGNIFICANT CHANGE UP
PROT/CREAT UR-RTO: SIGNIFICANT CHANGE UP RATIO (ref 0–0.2)
PROT/CREAT UR-RTO: SIGNIFICANT CHANGE UP RATIO (ref 0–0.2)
PROTHROM AB SERPL-ACNC: 10 SEC — SIGNIFICANT CHANGE UP (ref 9.5–13)
RBC # BLD: 3.49 M/UL — LOW (ref 3.8–5.2)
RBC # FLD: 12.6 % — SIGNIFICANT CHANGE UP (ref 10.3–14.5)
SODIUM SERPL-SCNC: 137 MMOL/L — SIGNIFICANT CHANGE UP (ref 135–145)
SP GR SPEC: 1 — LOW (ref 1–1.03)
URATE SERPL-MCNC: 5.1 MG/DL — SIGNIFICANT CHANGE UP (ref 2.5–7)
UROBILINOGEN FLD QL: 0.2 MG/DL — SIGNIFICANT CHANGE UP (ref 0.2–1)
WBC # BLD: 7.2 K/UL — SIGNIFICANT CHANGE UP (ref 3.8–10.5)
WBC # FLD AUTO: 7.2 K/UL — SIGNIFICANT CHANGE UP (ref 3.8–10.5)

## 2023-08-23 PROCEDURE — 0502F SUBSEQUENT PRENATAL CARE: CPT

## 2023-08-23 RX ORDER — ACETAMINOPHEN 500 MG
1000 TABLET ORAL ONCE
Refills: 0 | Status: COMPLETED | OUTPATIENT
Start: 2023-08-23 | End: 2023-08-23

## 2023-08-23 RX ADMIN — Medication 1000 MILLIGRAM(S): at 21:12

## 2023-08-23 RX ADMIN — Medication 1000 MILLIGRAM(S): at 20:34

## 2023-08-23 NOTE — OB PROVIDER TRIAGE NOTE - HISTORY OF PRESENT ILLNESS
33y , EGA@ 37.0 weeks, presents for elevated blood pressures and headache. Pt had two elevated blood pressures: 147/93 and 137/90 while in Dr. Avalos's office. Pt reports bitemporal headache, 5/10 pain, intermittent with associated nausea, no vomiting. Patient denies contractions, reports  + fetal movements,  denies leaking of fluid, denies vaginal bleeding. Pt denies any other concerns.  Antepartum course is significant for: denies

## 2023-08-23 NOTE — OB RN TRIAGE NOTE - FALL HARM RISK - UNIVERSAL INTERVENTIONS
Bed in lowest position, wheels locked, appropriate side rails in place/Call bell, personal items and telephone in reach/Instruct patient to call for assistance before getting out of bed or chair/Non-slip footwear when patient is out of bed/[place] to call system/Physically safe environment - no spills, clutter or unnecessary equipment/Purposeful Proactive Rounding/Room/bathroom lighting operational, light cord in reach

## 2023-08-23 NOTE — OB PROVIDER H&P - NSHPLABSRESULTS_GEN_ALL_CORE
----- Message from Almita Santillan sent at 4/12/2017 11:48 AM CDT -----  Contact: self  Pt calling to state that she missed her appointment for her refills on yesterday because her car broke down. Pt states she will be out of medication before 3/24 the next appt. Please call [phone]   this was already addressed on previous message send today   CBC Full  -  ( 23 Aug 2023 20:10 )  WBC Count : 7.20 K/uL  RBC Count : 3.49 M/uL  Hemoglobin : 10.0 g/dL  Hematocrit : 30.8 %  Platelet Count - Automated : 268 K/uL  Mean Cell Volume : 88.3 fL  Mean Cell Hemoglobin : 28.7 pg  Mean Cell Hemoglobin Concentration : 32.5 gm/dL  Auto Neutrophil # : 4.28 K/uL  Auto Lymphocyte # : 2.06 K/uL  Auto Monocyte # : 0.70 K/uL  Auto Eosinophil # : 0.10 K/uL  Auto Basophil # : 0.01 K/uL  Auto Neutrophil % : 59.5 %  Auto Lymphocyte % : 28.6 %  Auto Monocyte % : 9.7 %  Auto Eosinophil % : 1.4 %  Auto Basophil % : 0.1 %        137  |  105  |  4<L>  ----------------------------<  99  3.9   |  20<L>  |  0.50    Ca    8.5      23 Aug 2023 20:10    TPro  6.5  /  Alb  3.5  /  TBili  <0.2  /  DBili  x   /  AST  19  /  ALT  14  /  AlkPhos  135<H>      Urinalysis Basic - ( 23 Aug 2023 20:10 )    Color: Yellow / Appearance: Clear / S.003 / pH: x  Gluc: 99 mg/dL / Ketone: Negative mg/dL  / Bili: Negative / Urobili: 0.2 mg/dL   Blood: x / Protein: Negative mg/dL / Nitrite: Negative   Leuk Esterase: Negative / RBC: x / WBC x   Sq Epi: x / Non Sq Epi: x / Bacteria: x

## 2023-08-23 NOTE — OB PROVIDER H&P - NSLOWPPHRISK_OBGYN_A_OB
No previous uterine incision/Naik Pregnancy/Less than or equal to 4 previous vaginal births/No known bleeding disorder/No history of postpartum hemorrhage/No other PPH risks indicated

## 2023-08-23 NOTE — OB PROVIDER H&P - ASSESSMENT
33y female  @37w, admit for blood pressure monitoring and 24 hour urine.      - Pt reports headache is resolved s/p tylenol 1000 mg  - P:C ratio unable to calculate, urine resent at 2120, P:C ratio still unable to calculate    - Admit to Labor and Delivery  - Continuous EFM  - Regular diet  - Consent signed  - Standard labs (T&S, RPR)  - For blood pressure monitoring  - For PEC labs in AM  - For 24 hour urine  - Discussed with Dr. Seay 33y female  @37w, admit for IOL for gHTN.      - Pt reports headache is resolved s/p tylenol 1000 mg  - P:C ratio unable to calculate, urine resent at 2120, P:C ratio still unable to calculate  - @2347, pt had blood pressure 147/78    - Admit to Labor and Delivery  - Continuous EFM  - Clear liquid diet  - Consent signed  - Standard labs (T&S, RPR)  - For blood pressure monitoring  - For HELLP labs in AM  - Discussed with Dr. Seay

## 2023-08-23 NOTE — OB PROVIDER TRIAGE NOTE - NSHPPHYSICALEXAM_GEN_ALL_CORE
T(C): 36.8 (08-23-23 @ 20:06), Max: 36.8 (08-23-23 @ 20:06)  HR: 80 (08-23-23 @ 20:33) (78 - 85)  BP: 107/56 (08-23-23 @ 20:33) (107/56 - 128/78)  RR: 16 (08-23-23 @ 20:06) (16 - 16)  SpO2: --  – PE:   CV: RRR  Pulm: breathing comfortably on RA  Abd: soft, gravid, nontender  Extr: moving all extremities with ease  TAUS: images saved in ASOB, vertex position, posterior placenta, M mode: 182 FHR, ELIZABETH: 11.3, BPP 8/8  NST: baseline 145 FHR, moderate variability, + accels, - decels, reactive

## 2023-08-23 NOTE — OB PROVIDER TRIAGE NOTE - NSHPLABSRESULTS_GEN_ALL_CORE
CBC Full  -  ( 23 Aug 2023 20:10 )  WBC Count : 7.20 K/uL  RBC Count : 3.49 M/uL  Hemoglobin : 10.0 g/dL  Hematocrit : 30.8 %  Platelet Count - Automated : 268 K/uL  Mean Cell Volume : 88.3 fL  Mean Cell Hemoglobin : 28.7 pg  Mean Cell Hemoglobin Concentration : 32.5 gm/dL  Auto Neutrophil # : 4.28 K/uL  Auto Lymphocyte # : 2.06 K/uL  Auto Monocyte # : 0.70 K/uL  Auto Eosinophil # : 0.10 K/uL  Auto Basophil # : 0.01 K/uL  Auto Neutrophil % : 59.5 %  Auto Lymphocyte % : 28.6 %  Auto Monocyte % : 9.7 %  Auto Eosinophil % : 1.4 %  Auto Basophil % : 0.1 %        137  |  105  |  4<L>  ----------------------------<  99  3.9   |  20<L>  |  0.50    Ca    8.5      23 Aug 2023 20:10    TPro  6.5  /  Alb  3.5  /  TBili  <0.2  /  DBili  x   /  AST  19  /  ALT  14  /  AlkPhos  135<H>      Urinalysis Basic - ( 23 Aug 2023 20:10 )    Color: Yellow / Appearance: Clear / S.003 / pH: x  Gluc: 99 mg/dL / Ketone: Negative mg/dL  / Bili: Negative / Urobili: 0.2 mg/dL   Blood: x / Protein: Negative mg/dL / Nitrite: Negative   Leuk Esterase: Negative / RBC: x / WBC x   Sq Epi: x / Non Sq Epi: x / Bacteria: x

## 2023-08-23 NOTE — OB PROVIDER TRIAGE NOTE - NSOBPROVIDERNOTE_OBGYN_ALL_OB_FT
- Pt reports headache is resolved s/p tylenol 1000 mg  - P:C ratio unable to calculate, urine resent at 2120 33y female  @37w, admit for blood pressure monitoring and 24 hour urine.      - Pt reports headache is resolved s/p tylenol 1000 mg  - P:C ratio unable to calculate, urine resent at 2120, P:C ratio still unable to calculate    - Admit to Labor and Delivery  - Continuous EFM  - Regular diet  - Consent signed  - Standard labs (T&S, RPR)  - For blood pressure monitoring  - For PEC labs in AM  - For 24 hour urine  - Discussed with Dr. Seay

## 2023-08-24 ENCOUNTER — RX RENEWAL (OUTPATIENT)
Age: 33
End: 2023-08-24

## 2023-08-24 DIAGNOSIS — O13.9 GESTATIONAL [PREGNANCY-INDUCED] HYPERTENSION WITHOUT SIGNIFICANT PROTEINURIA, UNSPECIFIED TRIMESTER: ICD-10-CM

## 2023-08-24 DIAGNOSIS — R03.0 ELEVATED BLOOD-PRESSURE READING, WITHOUT DIAGNOSIS OF HYPERTENSION: ICD-10-CM

## 2023-08-24 LAB
BLD GP AB SCN SERPL QL: NEGATIVE — SIGNIFICANT CHANGE UP
RH IG SCN BLD-IMP: POSITIVE — SIGNIFICANT CHANGE UP
RUBV IGG SER-ACNC: 19.9 INDEX — SIGNIFICANT CHANGE UP
RUBV IGG SER-IMP: POSITIVE — SIGNIFICANT CHANGE UP
T PALLIDUM AB TITR SER: NEGATIVE — SIGNIFICANT CHANGE UP

## 2023-08-24 RX ORDER — SODIUM CHLORIDE 9 MG/ML
1000 INJECTION, SOLUTION INTRAVENOUS
Refills: 0 | Status: DISCONTINUED | OUTPATIENT
Start: 2023-08-24 | End: 2023-08-24

## 2023-08-24 RX ORDER — ALBUTEROL 90 UG/1
2 AEROSOL, METERED ORAL EVERY 6 HOURS
Refills: 0 | Status: DISCONTINUED | OUTPATIENT
Start: 2023-08-24 | End: 2023-08-26

## 2023-08-24 RX ORDER — CHLORHEXIDINE GLUCONATE 213 G/1000ML
1 SOLUTION TOPICAL DAILY
Refills: 0 | Status: DISCONTINUED | OUTPATIENT
Start: 2023-08-24 | End: 2023-08-25

## 2023-08-24 RX ORDER — CHLORHEXIDINE GLUCONATE 213 G/1000ML
1 SOLUTION TOPICAL DAILY
Refills: 0 | Status: DISCONTINUED | OUTPATIENT
Start: 2023-08-24 | End: 2023-08-24

## 2023-08-24 RX ORDER — OXYTOCIN 10 UNIT/ML
333.33 VIAL (ML) INJECTION
Qty: 20 | Refills: 0 | Status: DISCONTINUED | OUTPATIENT
Start: 2023-08-24 | End: 2023-08-24

## 2023-08-24 RX ORDER — ONDANSETRON 8 MG/1
4 TABLET, FILM COATED ORAL ONCE
Refills: 0 | Status: COMPLETED | OUTPATIENT
Start: 2023-08-24 | End: 2023-08-24

## 2023-08-24 RX ORDER — CITRIC ACID/SODIUM CITRATE 300-500 MG
15 SOLUTION, ORAL ORAL EVERY 6 HOURS
Refills: 0 | Status: DISCONTINUED | OUTPATIENT
Start: 2023-08-24 | End: 2023-08-24

## 2023-08-24 RX ORDER — SODIUM CHLORIDE 9 MG/ML
1000 INJECTION, SOLUTION INTRAVENOUS
Refills: 0 | Status: DISCONTINUED | OUTPATIENT
Start: 2023-08-24 | End: 2023-08-25

## 2023-08-24 RX ORDER — NIFEDIPINE 30 MG
10 TABLET, EXTENDED RELEASE 24 HR ORAL ONCE
Refills: 0 | Status: DISCONTINUED | OUTPATIENT
Start: 2023-08-24 | End: 2023-08-24

## 2023-08-24 RX ORDER — OXYTOCIN 10 UNIT/ML
333.33 VIAL (ML) INJECTION
Qty: 20 | Refills: 0 | Status: DISCONTINUED | OUTPATIENT
Start: 2023-08-24 | End: 2023-08-26

## 2023-08-24 RX ORDER — MORPHINE SULFATE 50 MG/1
4 CAPSULE, EXTENDED RELEASE ORAL ONCE
Refills: 0 | Status: DISCONTINUED | OUTPATIENT
Start: 2023-08-24 | End: 2023-08-24

## 2023-08-24 RX ORDER — CALCIUM CARBONATE 500(1250)
1 TABLET ORAL ONCE
Refills: 0 | Status: COMPLETED | OUTPATIENT
Start: 2023-08-24 | End: 2023-08-24

## 2023-08-24 RX ORDER — CITRIC ACID/SODIUM CITRATE 300-500 MG
15 SOLUTION, ORAL ORAL EVERY 6 HOURS
Refills: 0 | Status: DISCONTINUED | OUTPATIENT
Start: 2023-08-24 | End: 2023-08-25

## 2023-08-24 RX ORDER — OXYTOCIN 10 UNIT/ML
2 VIAL (ML) INJECTION
Qty: 30 | Refills: 0 | Status: DISCONTINUED | OUTPATIENT
Start: 2023-08-24 | End: 2023-08-26

## 2023-08-24 RX ADMIN — MORPHINE SULFATE 4 MILLIGRAM(S): 50 CAPSULE, EXTENDED RELEASE ORAL at 19:50

## 2023-08-24 RX ADMIN — MORPHINE SULFATE 4 MILLIGRAM(S): 50 CAPSULE, EXTENDED RELEASE ORAL at 18:36

## 2023-08-24 RX ADMIN — Medication 2 MILLIUNIT(S)/MIN: at 21:33

## 2023-08-24 RX ADMIN — ONDANSETRON 4 MILLIGRAM(S): 8 TABLET, FILM COATED ORAL at 18:36

## 2023-08-24 RX ADMIN — Medication 1 TABLET(S): at 22:47

## 2023-08-24 NOTE — OB PROVIDER LABOR PROGRESS NOTE - ASSESSMENT
Plan: 33y y/o  @ 37w3d in stable condition  - Continue with pitocin  - Continuous EFM, [place]  - Con't IVF    d/w attending physician Dr. Ross Carroll MD  PGY-3

## 2023-08-24 NOTE — OB PROVIDER LABOR PROGRESS NOTE - NS_SUBJECTIVE/OBJECTIVE_OBGYN_ALL_OB_FT
Patient seen for placement of cervical balloon. No complaints at this time.
R3 Labor & Delivery Progress Note     Pt seen & examined at bedside for cervical exam after balloon.    T(C): 36.5 (08-24-23 @ 17:53), Max: 36.8 (08-24-23 @ 03:53)  HR: 86 (08-24-23 @ 21:20) (63 - 102)  BP: 113/56 (08-24-23 @ 20:41) (103/63 - 147/78)  RR: 16 (08-24-23 @ 04:01) (16 - 16)  SpO2: 96% (08-24-23 @ 21:20) (92% - 99%)
Patient examined to assess cervical dilation. Patient reports feeling mild cramping, but states that pain is tolerable.    Vital Signs Last 24 Hrs  T(C): 36.6 (24 Aug 2023 16:44), Max: 36.8 (23 Aug 2023 20:06)  T(F): 97.88 (24 Aug 2023 16:44), Max: 98.24 (24 Aug 2023 04:01)  HR: 76 (24 Aug 2023 16:41) (68 - 102)  BP: 115/71 (24 Aug 2023 16:41) (103/63 - 147/78)  RR: 16 (24 Aug 2023 04:01) (16 - 16)

## 2023-08-24 NOTE — OB PROVIDER LABOR PROGRESS NOTE - NS_OBIHIFHRDETAILS_OBGYN_ALL_OB_FT
130 bpm, moderate variability, +accels, no decels
130 bpm, moderate variability, +accels, no decels, catergory I tracing
130/mod variability/+accels/-decels

## 2023-08-24 NOTE — OB RN PATIENT PROFILE - BREASTFEEDING PROVIDES MATERNAL HEALTH BENEFITS, DECREASED PREMENOPAUSAL BREAST CANCER, OVARIAN CANCER AND TYPE II DIABETES MELLITUS
Pt reports that she felt a two knots bilaterally on her  incision for the past four days. She denies that the area is tender to palpation. There is a small amount of discharge noted to the area.    Statement Selected

## 2023-08-24 NOTE — OB PROVIDER LABOR PROGRESS NOTE - ASSESSMENT
33 y.o  @ 37w3d IOL for gHTN, making cervical change    -VE /3  -Discussed with Dr. West  -Plan for cervical balloon placement  -Patient made aware of plan and agrees, but requests to wait until her fiance arrives for support.  -continue efm/gee Couch NP 33 y.o  @ 37w3d IOL for gHTN, making cervical change    -VE /-3  -Discussed with Dr. West  -Plan for cervical balloon placement  -Patient made aware of plan and agrees, but requests to wait until her fiance arrives for support.  -PO Cytotec held at this time due to contraction pattern  -Category I tracing  -continue efm/gee Couch NP

## 2023-08-24 NOTE — OB PROVIDER IHI INDUCTION/AUGMENTATION NOTE - NS_OBIHIREPANPSATTEST_OBGYN_ALL_OB
I have discussed with the Attending the patient's status, as well as the H&P and the fetal status. The Attending agreed with the suggested management. 5

## 2023-08-24 NOTE — OB PROVIDER LABOR PROGRESS NOTE - ASSESSMENT
-cervical balloon placed without incident, patient tolerated well. 60cc of NS inflated into both uterine and vaginal balloons  -Patient repositioned for comfort  -Continue to hold PO Cytotec until contraction pattern spaces out  -continue efm/toco    Abbie Couch NP

## 2023-08-25 LAB
ALBUMIN SERPL ELPH-MCNC: 3.1 G/DL — LOW (ref 3.3–5)
ALP SERPL-CCNC: 130 U/L — HIGH (ref 40–120)
ALT FLD-CCNC: 12 U/L — SIGNIFICANT CHANGE UP (ref 4–33)
ANION GAP SERPL CALC-SCNC: 14 MMOL/L — SIGNIFICANT CHANGE UP (ref 7–14)
APPEARANCE UR: ABNORMAL
APTT BLD: 24.5 SEC — SIGNIFICANT CHANGE UP (ref 24.5–35.6)
AST SERPL-CCNC: 19 U/L — SIGNIFICANT CHANGE UP (ref 4–32)
BACTERIA # UR AUTO: NEGATIVE /HPF — SIGNIFICANT CHANGE UP
BASOPHILS # BLD AUTO: 0.02 K/UL — SIGNIFICANT CHANGE UP (ref 0–0.2)
BASOPHILS NFR BLD AUTO: 0.2 % — SIGNIFICANT CHANGE UP (ref 0–2)
BILIRUB SERPL-MCNC: 0.4 MG/DL — SIGNIFICANT CHANGE UP (ref 0.2–1.2)
BILIRUB UR-MCNC: NEGATIVE — SIGNIFICANT CHANGE UP
BUN SERPL-MCNC: 4 MG/DL — LOW (ref 7–23)
CALCIUM SERPL-MCNC: 8.8 MG/DL — SIGNIFICANT CHANGE UP (ref 8.4–10.5)
CAST: 1 /LPF — SIGNIFICANT CHANGE UP (ref 0–4)
CHLORIDE SERPL-SCNC: 104 MMOL/L — SIGNIFICANT CHANGE UP (ref 98–107)
CO2 SERPL-SCNC: 20 MMOL/L — LOW (ref 22–31)
COLOR SPEC: YELLOW — SIGNIFICANT CHANGE UP
CREAT ?TM UR-MCNC: 186 MG/DL — SIGNIFICANT CHANGE UP
CREAT SERPL-MCNC: 0.6 MG/DL — SIGNIFICANT CHANGE UP (ref 0.5–1.3)
DIFF PNL FLD: ABNORMAL
EGFR: 121 ML/MIN/1.73M2 — SIGNIFICANT CHANGE UP
EOSINOPHIL # BLD AUTO: 0.03 K/UL — SIGNIFICANT CHANGE UP (ref 0–0.5)
EOSINOPHIL NFR BLD AUTO: 0.3 % — SIGNIFICANT CHANGE UP (ref 0–6)
FIBRINOGEN PPP-MCNC: 505 MG/DL — HIGH (ref 200–465)
GLUCOSE SERPL-MCNC: 79 MG/DL — SIGNIFICANT CHANGE UP (ref 70–99)
GLUCOSE UR QL: NEGATIVE MG/DL — SIGNIFICANT CHANGE UP
HCT VFR BLD CALC: 30.7 % — LOW (ref 34.5–45)
HGB BLD-MCNC: 10 G/DL — LOW (ref 11.5–15.5)
IANC: 6.85 K/UL — SIGNIFICANT CHANGE UP (ref 1.8–7.4)
IMM GRANULOCYTES NFR BLD AUTO: 0.4 % — SIGNIFICANT CHANGE UP (ref 0–0.9)
INR BLD: 0.97 RATIO — SIGNIFICANT CHANGE UP (ref 0.85–1.18)
KETONES UR-MCNC: 40 MG/DL
LDH SERPL L TO P-CCNC: 209 U/L — SIGNIFICANT CHANGE UP (ref 135–225)
LEUKOCYTE ESTERASE UR-ACNC: NEGATIVE — SIGNIFICANT CHANGE UP
LYMPHOCYTES # BLD AUTO: 2.03 K/UL — SIGNIFICANT CHANGE UP (ref 1–3.3)
LYMPHOCYTES # BLD AUTO: 21 % — SIGNIFICANT CHANGE UP (ref 13–44)
MCHC RBC-ENTMCNC: 28.7 PG — SIGNIFICANT CHANGE UP (ref 27–34)
MCHC RBC-ENTMCNC: 32.6 GM/DL — SIGNIFICANT CHANGE UP (ref 32–36)
MCV RBC AUTO: 88 FL — SIGNIFICANT CHANGE UP (ref 80–100)
MONOCYTES # BLD AUTO: 0.68 K/UL — SIGNIFICANT CHANGE UP (ref 0–0.9)
MONOCYTES NFR BLD AUTO: 7 % — SIGNIFICANT CHANGE UP (ref 2–14)
NEUTROPHILS # BLD AUTO: 6.85 K/UL — SIGNIFICANT CHANGE UP (ref 1.8–7.4)
NEUTROPHILS NFR BLD AUTO: 71.1 % — SIGNIFICANT CHANGE UP (ref 43–77)
NITRITE UR-MCNC: NEGATIVE — SIGNIFICANT CHANGE UP
NRBC # BLD: 0 /100 WBCS — SIGNIFICANT CHANGE UP (ref 0–0)
NRBC # FLD: 0 K/UL — SIGNIFICANT CHANGE UP (ref 0–0)
PH UR: 5.5 — SIGNIFICANT CHANGE UP (ref 5–8)
PLATELET # BLD AUTO: 237 K/UL — SIGNIFICANT CHANGE UP (ref 150–400)
POTASSIUM SERPL-MCNC: 3.9 MMOL/L — SIGNIFICANT CHANGE UP (ref 3.5–5.3)
POTASSIUM SERPL-SCNC: 3.9 MMOL/L — SIGNIFICANT CHANGE UP (ref 3.5–5.3)
PROT ?TM UR-MCNC: 24 MG/DL — SIGNIFICANT CHANGE UP
PROT ?TM UR-MCNC: 24 MG/DL — SIGNIFICANT CHANGE UP
PROT SERPL-MCNC: 6 G/DL — SIGNIFICANT CHANGE UP (ref 6–8.3)
PROT UR-MCNC: NEGATIVE MG/DL — SIGNIFICANT CHANGE UP
PROT/CREAT UR-RTO: 0.1 RATIO — SIGNIFICANT CHANGE UP (ref 0–0.2)
PROTHROM AB SERPL-ACNC: 10.9 SEC — SIGNIFICANT CHANGE UP (ref 9.5–13)
RBC # BLD: 3.49 M/UL — LOW (ref 3.8–5.2)
RBC # FLD: 12.8 % — SIGNIFICANT CHANGE UP (ref 10.3–14.5)
RBC CASTS # UR COMP ASSIST: 15 /HPF — HIGH (ref 0–4)
REVIEW: SIGNIFICANT CHANGE UP
SODIUM SERPL-SCNC: 138 MMOL/L — SIGNIFICANT CHANGE UP (ref 135–145)
SP GR SPEC: 1.02 — SIGNIFICANT CHANGE UP (ref 1–1.03)
SQUAMOUS # UR AUTO: 11 /HPF — HIGH (ref 0–5)
URATE SERPL-MCNC: 5.2 MG/DL — SIGNIFICANT CHANGE UP (ref 2.5–7)
UROBILINOGEN FLD QL: 0.2 MG/DL — SIGNIFICANT CHANGE UP (ref 0.2–1)
WBC # BLD: 9.65 K/UL — SIGNIFICANT CHANGE UP (ref 3.8–10.5)
WBC # FLD AUTO: 9.65 K/UL — SIGNIFICANT CHANGE UP (ref 3.8–10.5)
WBC UR QL: 11 /HPF — HIGH (ref 0–5)

## 2023-08-25 PROCEDURE — 59400 OBSTETRICAL CARE: CPT | Mod: U7,GC

## 2023-08-25 RX ORDER — TETANUS TOXOID, REDUCED DIPHTHERIA TOXOID AND ACELLULAR PERTUSSIS VACCINE, ADSORBED 5; 2.5; 8; 8; 2.5 [IU]/.5ML; [IU]/.5ML; UG/.5ML; UG/.5ML; UG/.5ML
0.5 SUSPENSION INTRAMUSCULAR ONCE
Refills: 0 | Status: DISCONTINUED | OUTPATIENT
Start: 2023-08-25 | End: 2023-08-26

## 2023-08-25 RX ORDER — FAMOTIDINE 10 MG/ML
20 INJECTION INTRAVENOUS ONCE
Refills: 0 | Status: COMPLETED | OUTPATIENT
Start: 2023-08-25 | End: 2023-08-25

## 2023-08-25 RX ORDER — SODIUM CHLORIDE 9 MG/ML
3 INJECTION INTRAMUSCULAR; INTRAVENOUS; SUBCUTANEOUS EVERY 8 HOURS
Refills: 0 | Status: DISCONTINUED | OUTPATIENT
Start: 2023-08-25 | End: 2023-08-26

## 2023-08-25 RX ORDER — SIMETHICONE 80 MG/1
80 TABLET, CHEWABLE ORAL EVERY 4 HOURS
Refills: 0 | Status: DISCONTINUED | OUTPATIENT
Start: 2023-08-25 | End: 2023-08-26

## 2023-08-25 RX ORDER — HYDROCORTISONE 1 %
1 OINTMENT (GRAM) TOPICAL EVERY 6 HOURS
Refills: 0 | Status: DISCONTINUED | OUTPATIENT
Start: 2023-08-25 | End: 2023-08-26

## 2023-08-25 RX ORDER — DIBUCAINE 1 %
1 OINTMENT (GRAM) RECTAL EVERY 6 HOURS
Refills: 0 | Status: DISCONTINUED | OUTPATIENT
Start: 2023-08-25 | End: 2023-08-26

## 2023-08-25 RX ORDER — IBUPROFEN 200 MG
600 TABLET ORAL EVERY 6 HOURS
Refills: 0 | Status: DISCONTINUED | OUTPATIENT
Start: 2023-08-25 | End: 2023-08-26

## 2023-08-25 RX ORDER — LANOLIN
1 OINTMENT (GRAM) TOPICAL EVERY 6 HOURS
Refills: 0 | Status: DISCONTINUED | OUTPATIENT
Start: 2023-08-25 | End: 2023-08-26

## 2023-08-25 RX ORDER — MAGNESIUM HYDROXIDE 400 MG/1
30 TABLET, CHEWABLE ORAL
Refills: 0 | Status: DISCONTINUED | OUTPATIENT
Start: 2023-08-25 | End: 2023-08-26

## 2023-08-25 RX ORDER — OXYTOCIN 10 UNIT/ML
41.67 VIAL (ML) INJECTION
Qty: 20 | Refills: 0 | Status: DISCONTINUED | OUTPATIENT
Start: 2023-08-25 | End: 2023-08-26

## 2023-08-25 RX ORDER — OXYCODONE HYDROCHLORIDE 5 MG/1
5 TABLET ORAL
Refills: 0 | Status: DISCONTINUED | OUTPATIENT
Start: 2023-08-25 | End: 2023-08-26

## 2023-08-25 RX ORDER — OXYCODONE HYDROCHLORIDE 5 MG/1
5 TABLET ORAL ONCE
Refills: 0 | Status: DISCONTINUED | OUTPATIENT
Start: 2023-08-25 | End: 2023-08-26

## 2023-08-25 RX ORDER — PRAMOXINE HYDROCHLORIDE 150 MG/15G
1 AEROSOL, FOAM RECTAL EVERY 4 HOURS
Refills: 0 | Status: DISCONTINUED | OUTPATIENT
Start: 2023-08-25 | End: 2023-08-26

## 2023-08-25 RX ORDER — KETOROLAC TROMETHAMINE 30 MG/ML
30 SYRINGE (ML) INJECTION ONCE
Refills: 0 | Status: DISCONTINUED | OUTPATIENT
Start: 2023-08-25 | End: 2023-08-26

## 2023-08-25 RX ORDER — BENZOCAINE 10 %
1 GEL (GRAM) MUCOUS MEMBRANE EVERY 6 HOURS
Refills: 0 | Status: DISCONTINUED | OUTPATIENT
Start: 2023-08-25 | End: 2023-08-26

## 2023-08-25 RX ORDER — ACETAMINOPHEN 500 MG
975 TABLET ORAL
Refills: 0 | Status: DISCONTINUED | OUTPATIENT
Start: 2023-08-25 | End: 2023-08-26

## 2023-08-25 RX ORDER — AER TRAVELER 0.5 G/1
1 SOLUTION RECTAL; TOPICAL EVERY 4 HOURS
Refills: 0 | Status: DISCONTINUED | OUTPATIENT
Start: 2023-08-25 | End: 2023-08-26

## 2023-08-25 RX ORDER — IBUPROFEN 200 MG
600 TABLET ORAL EVERY 6 HOURS
Refills: 0 | Status: COMPLETED | OUTPATIENT
Start: 2023-08-25 | End: 2024-07-23

## 2023-08-25 RX ORDER — DIPHENHYDRAMINE HCL 50 MG
25 CAPSULE ORAL EVERY 6 HOURS
Refills: 0 | Status: DISCONTINUED | OUTPATIENT
Start: 2023-08-25 | End: 2023-08-26

## 2023-08-25 RX ADMIN — Medication 600 MILLIGRAM(S): at 20:37

## 2023-08-25 RX ADMIN — FAMOTIDINE 20 MILLIGRAM(S): 10 INJECTION INTRAVENOUS at 11:35

## 2023-08-25 RX ADMIN — Medication 975 MILLIGRAM(S): at 17:41

## 2023-08-25 RX ADMIN — Medication 975 MILLIGRAM(S): at 18:30

## 2023-08-25 RX ADMIN — Medication 600 MILLIGRAM(S): at 21:10

## 2023-08-25 NOTE — PROVIDER CONTACT NOTE (OTHER) - ASSESSMENT
While completing orthostatic blood pressures Lying down: 121/66; P87, Sittin/75; P108, Standin/78; P115. Patient presents with negative orthostatics and pulse ranges. MD Sanchez notified by Primary RN due to elevated pulse rate. Patient is asymptomatic.

## 2023-08-25 NOTE — OB PROVIDER DELIVERY SUMMARY - NSPROVIDERDELIVERYNOTE_OBGYN_ALL_OB_FT
Live infant delivered in VAIBHAV position.    Spontaneous cry. Infant passed to the mother. Cord clamping was delayed for 1 minute. (Infant handed to pediatrics at the bedside given category II tracing). Cord gasses obtained via a segment of cord. Infant to  nursery.    Placenta was delivered spontaneously intact. Uterine massage was performed and Oxytocin was given upon delivery of the placenta. Fundus noted to be firm.     First degree laceration and R akira-urethral laceration repaired w/ 2-0 chromic.      Adequate hemostasis. QBL: 65  Count correct x2.     Sona Brothers PGY2

## 2023-08-25 NOTE — OB RN DELIVERY SUMMARY - BABY A: STOOL IN DELIVERY
Problem: POSTPARTUM  Goal: Long Term Goal:Experiences normal postpartum course  Description  INTERVENTIONS:  - Assess and monitor vital signs and lab values. - Assess fundus and lochia. - Provide ice/sitz baths for perineum discomfort.   - Monitor heali no pain/trauma. - Instruct and provide assistance with proper latch. - Review techniques for milk expression (breast pumping) and storage of breast milk. Provide pumping equipment/supplies, instructions and assistance, as needed.   - Encourage rooming-in and

## 2023-08-25 NOTE — OB PROVIDER DELIVERY SUMMARY - NSSELHIDDEN_OBGYN_ALL_OB_FT
[NS_DeliveryAttending1_OBGYN_ALL_OB_FT:[id number]],[NS_DeliveryAssist1_OBGYN_ALL_OB_FT:[id number]]

## 2023-08-25 NOTE — OB RN DELIVERY SUMMARY - NS_SEPSISRSKCALC_OBGYN_ALL_OB_FT
EOS calculated successfully. EOS Risk Factor: 0.5/1000 live births ([facility] national incidence); GA=37w4d; Temp=98.96; ROM=2.367; GBS='Negative'; Antibiotics='No antibiotics or any antibiotics < 2 hrs prior to birth'

## 2023-08-25 NOTE — OB RN DELIVERY SUMMARY - NSSELHIDDEN_OBGYN_ALL_OB_FT
[NS_DeliveryAttending1_OBGYN_ALL_OB_FT:[id number]],[NS_DeliveryAssist1_OBGYN_ALL_OB_FT:[id number]],[NS_DeliveryRN_OBGYN_ALL_OB_FT:[id number]=]

## 2023-08-26 ENCOUNTER — TRANSCRIPTION ENCOUNTER (OUTPATIENT)
Age: 33
End: 2023-08-26

## 2023-08-26 VITALS
RESPIRATION RATE: 16 BRPM | TEMPERATURE: 98 F | HEART RATE: 80 BPM | OXYGEN SATURATION: 98 % | SYSTOLIC BLOOD PRESSURE: 110 MMHG | DIASTOLIC BLOOD PRESSURE: 68 MMHG

## 2023-08-26 RX ORDER — ACETAMINOPHEN 500 MG
3 TABLET ORAL
Qty: 0 | Refills: 0 | DISCHARGE
Start: 2023-08-26

## 2023-08-26 RX ORDER — IBUPROFEN 200 MG
1 TABLET ORAL
Qty: 0 | Refills: 0 | DISCHARGE
Start: 2023-08-26

## 2023-08-26 RX ORDER — ALBUTEROL 90 UG/1
2 AEROSOL, METERED ORAL
Qty: 0 | Refills: 0 | DISCHARGE
Start: 2023-08-26

## 2023-08-26 RX ADMIN — Medication 600 MILLIGRAM(S): at 06:20

## 2023-08-26 RX ADMIN — Medication 600 MILLIGRAM(S): at 05:52

## 2023-08-26 RX ADMIN — Medication 600 MILLIGRAM(S): at 12:08

## 2023-08-26 RX ADMIN — SODIUM CHLORIDE 3 MILLILITER(S): 9 INJECTION INTRAMUSCULAR; INTRAVENOUS; SUBCUTANEOUS at 06:26

## 2023-08-26 RX ADMIN — Medication 600 MILLIGRAM(S): at 11:16

## 2023-08-26 NOTE — PROGRESS NOTE ADULT - ASSESSMENT
A/P: 34yo PPD#1 s/p  c/b gHTN.  Patient is stable and doing well post-partum.     #Postpartum  - Pain well controlled, continue current pain regimen  - Increase ambulation, SCDs when not ambulating  - Continue regular diet    gHTN  -cw BP monitoring, BP: 125/71 (23 @ 22:30) (112/77 - 125/71)  -HELLP wnl  -Pt not on medication  -No PEC symptoms    Sade Lake MD PGY1

## 2023-08-26 NOTE — DISCHARGE NOTE OB - CARE PROVIDER_API CALL
Teodoro Avalos  Maternal/Fetal Medicine  [address], [address]  [place] [address]  Phone: [phone]  Fax: [phone]  Follow Up Time:

## 2023-08-26 NOTE — PROGRESS NOTE ADULT - SUBJECTIVE AND OBJECTIVE BOX
OB Progress Note:  PPD#1    S: 32yo  PPD#1 s/p  c/b gHTN. Patient feels well. Pain is well controlled. She is tolerating a regular diet and passing flatus. She is voiding spontaneously, and ambulating without difficulty. Denies CP/SOB. Denies lightheadedness/dizziness. Denies N/V.    O:  Vitals:  Vital Signs Last 24 Hrs  T(C): 36.8 (25 Aug 2023 22:30), Max: 37.2 (25 Aug 2023 07:26)  T(F): 98.2 (25 Aug 2023 22:30), Max: 98.96 (25 Aug 2023 07:26)  HR: 81 (25 Aug 2023 22:30) (67 - 115)  BP: 125/71 (25 Aug 2023 22:30) (87/47 - 148/113)  BP(mean): --  RR: 18 (25 Aug 2023 22:30) (16 - 18)  SpO2: 97% (25 Aug 2023 22:30) (92% - 100%)    Parameters below as of 25 Aug 2023 22:30  Patient On (Oxygen Delivery Method): room air        MEDICATIONS  (STANDING):  acetaminophen     Tablet .. 975 milliGRAM(s) Oral <User Schedule>  diphtheria/tetanus/pertussis (acellular) Vaccine (Adacel) 0.5 milliLiter(s) IntraMuscular once  ibuprofen  Tablet. 600 milliGRAM(s) Oral every 6 hours  ketorolac   Injectable 30 milliGRAM(s) IV Push once  oxytocin Infusion 333.333 milliUNIT(s)/Min (1000 mL/Hr) IV Continuous <Continuous>  oxytocin Infusion 41.667 milliUNIT(s)/Min (125 mL/Hr) IV Continuous <Continuous>  oxytocin Infusion. 2 milliUNIT(s)/Min (2 mL/Hr) IV Continuous <Continuous>  prenatal multivitamin 1 Tablet(s) Oral daily  sodium chloride 0.9% lock flush 3 milliLiter(s) IV Push every 8 hours      Labs:  Blood type: A Positive  Rubella IgG: RPR: Negative                          10.0<L>   9.65 >-----------< 237    (  @ 02:45 )             30.7<L>                        10.0<L>   7.20 >-----------< 268    (  @ 20:10 )             30.8<L>    23 @ 02:45      138  |  104  |  4<L>  ----------------------------<  79  3.9   |  20<L>  |  0.60    23 @ 20:10      137  |  105  |  4<L>  ----------------------------<  99  3.9   |  20<L>  |  0.50        Ca    8.8      25 Aug 2023 02:45  Ca    8.5      23 Aug 2023 20:10    TPro  6.0  /  Alb  3.1<L>  /  TBili  0.4  /  DBili  x   /  AST  19  /  ALT  12  /  AlkPhos  130<H>  23 @ 02:45  TPro  6.5  /  Alb  3.5  /  TBili  <0.2  /  DBili  x   /  AST  19  /  ALT  14  /  AlkPhos  135<H>  23 @ 20:10          Physical Exam:  General: NAD  Abdomen: soft, non-tender, non-distended, fundus firm  Vaginal: Lochia wnl  Extremities: No erythema/edema

## 2023-08-26 NOTE — DISCHARGE NOTE OB - PATIENT PORTAL LINK FT
You can access the FollowMyHealth Patient Portal offered by [facility] by registering at the following website: [email or web address]. By joining [employer]’s FollowMyHealth portal, you will also be able to view your health information using other applications (apps) compatible with our system.

## 2023-08-26 NOTE — DISCHARGE NOTE OB - MEDICATION SUMMARY - MEDICATIONS TO TAKE
I will START or STAY ON the medications listed below when I get home from the hospital:    ibuprofen 600 mg oral tablet  -- 1 tab(s) by mouth every 6 hours  -- Indication: For     acetaminophen 325 mg oral tablet  -- 3 tab(s) by mouth every 6 hours  -- Indication: For     albuterol 90 mcg/inh inhalation aerosol  -- 2 puff(s) inhaled every 6 hours As needed Shortness of Breath and/or Wheezing  -- Indication: For home med    Prenatal Multivitamins with Folic Acid 1 mg oral tablet  -- 1 tab(s) by mouth once a day  -- Indication: For

## 2023-08-26 NOTE — DISCHARGE NOTE OB - CARE PLAN
1 Principal Discharge DX:	Vaginal delivery  Assessment and plan of treatment:	OOB, Reg diet, analgesia PRN, pelvic rest

## 2023-08-26 NOTE — PROGRESS NOTE ADULT - ATTENDING COMMENTS
Pt seen and examined and agree with above assessment and plan.  Stable for discharge home w/home BP monitoring and close-interval follow up

## 2023-08-26 NOTE — DISCHARGE NOTE OB - NS MD DC FALL RISK RISK
For information on Fall & Injury Prevention, visit: [email or web address] OR  [email or web address] OR  https://www.cdc.gov/steadi/patient.html

## 2023-08-27 ENCOUNTER — NON-APPOINTMENT (OUTPATIENT)
Age: 33
End: 2023-08-27

## 2023-08-27 ENCOUNTER — TRANSCRIPTION ENCOUNTER (OUTPATIENT)
Age: 33
End: 2023-08-27

## 2023-08-27 RX ORDER — HYDROCORTISONE 25 MG/G
2.5 CREAM TOPICAL DAILY
Qty: 30 | Refills: 0 | Status: DISCONTINUED | COMMUNITY
Start: 2023-07-18 | End: 2023-08-27

## 2023-08-27 RX ORDER — ACETAMINOPHEN 325 MG/1
325 TABLET ORAL
Refills: 0 | Status: ACTIVE | COMMUNITY
Start: 2023-08-27

## 2023-08-27 RX ORDER — HYDROCORTISONE 25 MG/G
2.5 CREAM TOPICAL DAILY
Qty: 1 | Refills: 2 | Status: DISCONTINUED | COMMUNITY
Start: 2023-05-23 | End: 2023-08-27

## 2023-08-27 RX ORDER — ONDANSETRON 4 MG/1
4 TABLET ORAL
Qty: 14 | Refills: 0 | Status: DISCONTINUED | COMMUNITY
Start: 2023-03-16 | End: 2023-08-27

## 2023-08-29 ENCOUNTER — NON-APPOINTMENT (OUTPATIENT)
Age: 33
End: 2023-08-29

## 2023-08-29 PROBLEM — J45.909 UNSPECIFIED ASTHMA, UNCOMPLICATED: Chronic | Status: ACTIVE | Noted: 2023-08-23

## 2023-09-05 ENCOUNTER — NON-APPOINTMENT (OUTPATIENT)
Age: 33
End: 2023-09-05

## 2023-09-05 ENCOUNTER — APPOINTMENT (OUTPATIENT)
Dept: OBGYN | Facility: CLINIC | Age: 33
End: 2023-09-05

## 2023-09-14 ENCOUNTER — NON-APPOINTMENT (OUTPATIENT)
Age: 33
End: 2023-09-14

## 2023-09-22 ENCOUNTER — NON-APPOINTMENT (OUTPATIENT)
Age: 33
End: 2023-09-22

## 2023-09-28 ENCOUNTER — NON-APPOINTMENT (OUTPATIENT)
Age: 33
End: 2023-09-28

## 2023-10-03 ENCOUNTER — RX RENEWAL (OUTPATIENT)
Age: 33
End: 2023-10-03

## 2023-10-03 RX ORDER — PNV 112/IRON/FOLIC/OM3/DHA/EPA 3.33-.33MG
3.33-0.333-34.8 TABLET,CHEWABLE ORAL
Qty: 90 | Refills: 2 | Status: ACTIVE | COMMUNITY
Start: 2023-02-24 | End: 1900-01-01

## 2023-10-10 ENCOUNTER — APPOINTMENT (OUTPATIENT)
Dept: OBGYN | Facility: CLINIC | Age: 33
End: 2023-10-10
Payer: COMMERCIAL

## 2023-10-10 VITALS
BODY MASS INDEX: 30.73 KG/M2 | HEIGHT: 64 IN | DIASTOLIC BLOOD PRESSURE: 81 MMHG | SYSTOLIC BLOOD PRESSURE: 125 MMHG | WEIGHT: 180 LBS

## 2023-10-10 PROCEDURE — 0503F POSTPARTUM CARE VISIT: CPT

## 2023-10-10 RX ORDER — NORETHINDRONE 0.35 MG/1
0.35 TABLET ORAL DAILY
Qty: 3 | Refills: 1 | Status: ACTIVE | COMMUNITY
Start: 2023-10-10 | End: 1900-01-01

## 2023-10-26 NOTE — ED ADULT NURSE NOTE - HOW OFTEN DO YOU HAVE A DRINK CONTAINING ALCOHOL?
Encounter Diagnoses   Name Primary?    Esotropia, both eyes Yes    Hyperopia of both eyes     Astigmatism of both eyes, unspecified type        - 3 y/o EP POH partially refractive ET, hyperopia presenting for f/u  - Parents report attempting patching with minimal success  - Today with persistent ET at near and distance with glasses  - Discussed findings with parents in detail as well possible treatment options including glasses/bifocals, patching, surgery  - Will plan to dispense updated Rx and assess for interval improvement  - RTC 3-4 months, sooner PRN  
Never

## 2024-01-23 ENCOUNTER — APPOINTMENT (OUTPATIENT)
Dept: OBGYN | Facility: CLINIC | Age: 34
End: 2024-01-23

## 2024-03-30 NOTE — DISCHARGE NOTE OB - CARE PROVIDERS DIRECT ADDRESSES
[TextEntry] : Christina is a 14yo F with a history of syncope with exercise, SOB, ZAMARRIPA, and finger and toe paresthesia. Patient was referred to cardiogenomics clinic to rule out genetic predisposition to cardiomyopathy and arrhythmia. The differences between hereditary and sporadic cardiomyopathy and arrhythmias were reviewed with the patient. She and her mother elected for her to undergo genetic testing due to concerns for personal history, definitive diagnosis and disease management. Results may change medical management for the patient. Patient was adopted and her family history is unknown.   Patient and her mother expressed understanding of the presented information and satisfaction with having all of their questions and concerns addressed. [facility] Combined cardiac panel will be ordered today.    
[email or web address]

## 2024-08-26 NOTE — OB PROVIDER H&P - NSICDXPASTSURGICALHX_GEN_ALL_CORE_FT
Attending Attestation (For Attendings USE Only)...
PAST SURGICAL HISTORY:  No significant past surgical history

## 2024-10-14 ENCOUNTER — APPOINTMENT (OUTPATIENT)
Dept: OBGYN | Facility: CLINIC | Age: 34
End: 2024-10-14

## 2025-04-10 NOTE — OB PROVIDER DELIVERY SUMMARY - [email or web address]
Pre and post operative expectations and routines explained to patient, verbalized understanding.Oriented pt to call light  (in patients reach) / verbalized understanding /demonstrates use  Bed position is in low   Side rails up X 2 /gurney locked    Sona Brothers MD

## 2025-05-21 ENCOUNTER — INPATIENT (INPATIENT)
Facility: HOSPITAL | Age: 35
LOS: 2 days | Discharge: HOME | End: 2025-05-24
Attending: OBSTETRICS & GYNECOLOGY | Admitting: OBSTETRICS & GYNECOLOGY
Payer: COMMERCIAL

## 2025-05-21 VITALS
TEMPERATURE: 98 F | DIASTOLIC BLOOD PRESSURE: 80 MMHG | SYSTOLIC BLOOD PRESSURE: 116 MMHG | OXYGEN SATURATION: 97 % | RESPIRATION RATE: 18 BRPM | HEART RATE: 94 BPM

## 2025-05-21 DIAGNOSIS — O24.410 GESTATIONAL DIABETES MELLITUS IN PREGNANCY, DIET CONTROLLED: ICD-10-CM

## 2025-05-21 LAB — GLUCOSE BLDC GLUCOMTR-MCNC: 100 MG/DL — HIGH (ref 70–99)

## 2025-05-21 RX ORDER — SODIUM CHLORIDE 9 G/1000ML
1000 INJECTION, SOLUTION INTRAVENOUS
Refills: 0 | Status: DISCONTINUED | OUTPATIENT
Start: 2025-05-21 | End: 2025-05-22

## 2025-05-21 RX ORDER — OXYTOCIN-SODIUM CHLORIDE 0.9% IV SOLN 30 UNIT/500ML 30-0.9/5 UT/ML-%
167 SOLUTION INTRAVENOUS
Qty: 30 | Refills: 0 | Status: DISCONTINUED | OUTPATIENT
Start: 2025-05-21 | End: 2025-05-22

## 2025-05-21 RX ORDER — SODIUM CHLORIDE 9 G/1000ML
1000 INJECTION, SOLUTION INTRAVENOUS
Refills: 0 | Status: DISCONTINUED | OUTPATIENT
Start: 2025-05-21 | End: 2025-05-21

## 2025-05-21 RX ORDER — CITRIC ACID/SODIUM CITRATE 300-500 MG
15 SOLUTION, ORAL ORAL EVERY 6 HOURS
Refills: 0 | Status: DISCONTINUED | OUTPATIENT
Start: 2025-05-21 | End: 2025-05-22

## 2025-05-21 RX ADMIN — SODIUM CHLORIDE 125 MILLILITER(S): 9 INJECTION, SOLUTION INTRAVENOUS at 23:47

## 2025-05-21 RX ADMIN — Medication 125 MILLILITER(S): at 23:48

## 2025-05-22 LAB
BASOPHILS # BLD AUTO: 0.01 K/UL — SIGNIFICANT CHANGE UP (ref 0–0.2)
BASOPHILS NFR BLD AUTO: 0.1 % — SIGNIFICANT CHANGE UP (ref 0–2)
BLD GP AB SCN SERPL QL: NEGATIVE — SIGNIFICANT CHANGE UP
EOSINOPHIL # BLD AUTO: 0.07 K/UL — SIGNIFICANT CHANGE UP (ref 0–0.5)
EOSINOPHIL NFR BLD AUTO: 0.9 % — SIGNIFICANT CHANGE UP (ref 0–6)
GLUCOSE BLDC GLUCOMTR-MCNC: 89 MG/DL — SIGNIFICANT CHANGE UP (ref 70–99)
GLUCOSE BLDC GLUCOMTR-MCNC: 90 MG/DL — SIGNIFICANT CHANGE UP (ref 70–99)
HCT VFR BLD CALC: 29.8 % — LOW (ref 34.5–45)
HGB BLD-MCNC: 9.8 G/DL — LOW (ref 11.5–15.5)
IMM GRANULOCYTES NFR BLD AUTO: 0.9 % — SIGNIFICANT CHANGE UP (ref 0–0.9)
LYMPHOCYTES # BLD AUTO: 2.1 K/UL — SIGNIFICANT CHANGE UP (ref 1–3.3)
LYMPHOCYTES # BLD AUTO: 26.9 % — SIGNIFICANT CHANGE UP (ref 13–44)
MCHC RBC-ENTMCNC: 28 PG — SIGNIFICANT CHANGE UP (ref 27–34)
MCHC RBC-ENTMCNC: 32.9 G/DL — SIGNIFICANT CHANGE UP (ref 32–36)
MCV RBC AUTO: 85.1 FL — SIGNIFICANT CHANGE UP (ref 80–100)
MONOCYTES # BLD AUTO: 0.7 K/UL — SIGNIFICANT CHANGE UP (ref 0–0.9)
MONOCYTES NFR BLD AUTO: 9 % — SIGNIFICANT CHANGE UP (ref 2–14)
NEUTROPHILS # BLD AUTO: 4.86 K/UL — SIGNIFICANT CHANGE UP (ref 1.8–7.4)
NEUTROPHILS NFR BLD AUTO: 62.2 % — SIGNIFICANT CHANGE UP (ref 43–77)
NRBC BLD AUTO-RTO: 0 /100 WBCS — SIGNIFICANT CHANGE UP (ref 0–0)
PLATELET # BLD AUTO: 207 K/UL — SIGNIFICANT CHANGE UP (ref 150–400)
RBC # BLD: 3.5 M/UL — LOW (ref 3.8–5.2)
RBC # FLD: 13.4 % — SIGNIFICANT CHANGE UP (ref 10.3–14.5)
RH IG SCN BLD-IMP: POSITIVE — SIGNIFICANT CHANGE UP
RH IG SCN BLD-IMP: POSITIVE — SIGNIFICANT CHANGE UP
T PALLIDUM AB TITR SER: NEGATIVE — SIGNIFICANT CHANGE UP
WBC # BLD: 7.81 K/UL — SIGNIFICANT CHANGE UP (ref 3.8–10.5)
WBC # FLD AUTO: 7.81 K/UL — SIGNIFICANT CHANGE UP (ref 3.8–10.5)

## 2025-05-22 RX ORDER — MODIFIED LANOLIN 100 %
1 CREAM (GRAM) TOPICAL EVERY 6 HOURS
Refills: 0 | Status: DISCONTINUED | OUTPATIENT
Start: 2025-05-22 | End: 2025-05-24

## 2025-05-22 RX ORDER — SIMETHICONE 80 MG
80 TABLET,CHEWABLE ORAL EVERY 4 HOURS
Refills: 0 | Status: DISCONTINUED | OUTPATIENT
Start: 2025-05-22 | End: 2025-05-24

## 2025-05-22 RX ORDER — BENZOCAINE 220 MG/G
1 SPRAY, METERED PERIODONTAL EVERY 6 HOURS
Refills: 0 | Status: DISCONTINUED | OUTPATIENT
Start: 2025-05-22 | End: 2025-05-24

## 2025-05-22 RX ORDER — ONDANSETRON HCL/PF 4 MG/2 ML
4 VIAL (ML) INJECTION ONCE
Refills: 0 | Status: COMPLETED | OUTPATIENT
Start: 2025-05-22 | End: 2025-05-22

## 2025-05-22 RX ORDER — ACETAMINOPHEN 500 MG/5ML
975 LIQUID (ML) ORAL
Refills: 0 | Status: DISCONTINUED | OUTPATIENT
Start: 2025-05-22 | End: 2025-05-24

## 2025-05-22 RX ORDER — PRENATAL 136/IRON/FOLIC ACID 27 MG-1 MG
1 TABLET ORAL DAILY
Refills: 0 | Status: DISCONTINUED | OUTPATIENT
Start: 2025-05-22 | End: 2025-05-24

## 2025-05-22 RX ORDER — IBUPROFEN 200 MG
600 TABLET ORAL EVERY 6 HOURS
Refills: 0 | Status: DISCONTINUED | OUTPATIENT
Start: 2025-05-22 | End: 2025-05-24

## 2025-05-22 RX ORDER — WITCH HAZEL LEAF
1 FLUID EXTRACT MISCELLANEOUS EVERY 4 HOURS
Refills: 0 | Status: DISCONTINUED | OUTPATIENT
Start: 2025-05-22 | End: 2025-05-24

## 2025-05-22 RX ORDER — OXYTOCIN-SODIUM CHLORIDE 0.9% IV SOLN 30 UNIT/500ML 30-0.9/5 UT/ML-%
167 SOLUTION INTRAVENOUS
Qty: 30 | Refills: 0 | Status: DISCONTINUED | OUTPATIENT
Start: 2025-05-22 | End: 2025-05-24

## 2025-05-22 RX ORDER — OXYCODONE HYDROCHLORIDE 30 MG/1
5 TABLET ORAL
Refills: 0 | Status: DISCONTINUED | OUTPATIENT
Start: 2025-05-22 | End: 2025-05-24

## 2025-05-22 RX ORDER — MAGNESIUM HYDROXIDE 400 MG/5ML
30 SUSPENSION ORAL
Refills: 0 | Status: DISCONTINUED | OUTPATIENT
Start: 2025-05-22 | End: 2025-05-24

## 2025-05-22 RX ORDER — PRAMOXINE HCL 1 %
1 GEL (GRAM) TOPICAL EVERY 4 HOURS
Refills: 0 | Status: DISCONTINUED | OUTPATIENT
Start: 2025-05-22 | End: 2025-05-24

## 2025-05-22 RX ORDER — DIBUCAINE 10 MG/G
1 OINTMENT TOPICAL EVERY 6 HOURS
Refills: 0 | Status: DISCONTINUED | OUTPATIENT
Start: 2025-05-22 | End: 2025-05-24

## 2025-05-22 RX ORDER — OXYCODONE HYDROCHLORIDE 30 MG/1
5 TABLET ORAL ONCE
Refills: 0 | Status: DISCONTINUED | OUTPATIENT
Start: 2025-05-22 | End: 2025-05-24

## 2025-05-22 RX ORDER — OXYTOCIN-SODIUM CHLORIDE 0.9% IV SOLN 30 UNIT/500ML 30-0.9/5 UT/ML-%
4 SOLUTION INTRAVENOUS
Qty: 30 | Refills: 0 | Status: DISCONTINUED | OUTPATIENT
Start: 2025-05-22 | End: 2025-05-22

## 2025-05-22 RX ORDER — DIPHENHYDRAMINE HCL 12.5MG/5ML
25 ELIXIR ORAL EVERY 6 HOURS
Refills: 0 | Status: DISCONTINUED | OUTPATIENT
Start: 2025-05-22 | End: 2025-05-24

## 2025-05-22 RX ORDER — KETOROLAC TROMETHAMINE 30 MG/ML
30 INJECTION, SOLUTION INTRAMUSCULAR; INTRAVENOUS ONCE
Refills: 0 | Status: DISCONTINUED | OUTPATIENT
Start: 2025-05-22 | End: 2025-05-22

## 2025-05-22 RX ORDER — HYDROCORTISONE 10 MG/G
1 CREAM TOPICAL EVERY 6 HOURS
Refills: 0 | Status: DISCONTINUED | OUTPATIENT
Start: 2025-05-22 | End: 2025-05-24

## 2025-05-22 RX ORDER — IBUPROFEN 200 MG
600 TABLET ORAL EVERY 6 HOURS
Refills: 0 | Status: COMPLETED | OUTPATIENT
Start: 2025-05-22 | End: 2026-04-20

## 2025-05-22 RX ADMIN — Medication 600 MILLIGRAM(S): at 23:13

## 2025-05-22 RX ADMIN — Medication 975 MILLIGRAM(S): at 16:42

## 2025-05-22 RX ADMIN — KETOROLAC TROMETHAMINE 30 MILLIGRAM(S): 30 INJECTION, SOLUTION INTRAMUSCULAR; INTRAVENOUS at 12:04

## 2025-05-22 RX ADMIN — OXYTOCIN-SODIUM CHLORIDE 0.9% IV SOLN 30 UNIT/500ML 4 MILLIUNIT(S)/MIN: 30-0.9/5 SOLUTION at 06:24

## 2025-05-22 RX ADMIN — Medication 975 MILLIGRAM(S): at 20:34

## 2025-05-22 RX ADMIN — Medication 20 MILLIGRAM(S): at 04:41

## 2025-05-22 RX ADMIN — Medication 3 MILLILITER(S): at 16:44

## 2025-05-22 RX ADMIN — Medication 975 MILLIGRAM(S): at 21:34

## 2025-05-22 RX ADMIN — Medication 4 MILLIGRAM(S): at 05:04

## 2025-05-22 RX ADMIN — OXYTOCIN-SODIUM CHLORIDE 0.9% IV SOLN 30 UNIT/500ML 167 MILLIUNIT(S)/MIN: 30-0.9/5 SOLUTION at 12:03

## 2025-05-22 RX ADMIN — Medication 975 MILLIGRAM(S): at 15:43

## 2025-05-23 ENCOUNTER — TRANSCRIPTION ENCOUNTER (OUTPATIENT)
Age: 35
End: 2025-05-23

## 2025-05-23 RX ORDER — IBUPROFEN 200 MG
1 TABLET ORAL
Qty: 0 | Refills: 0 | DISCHARGE
Start: 2025-05-23

## 2025-05-23 RX ORDER — SENNA 187 MG
2 TABLET ORAL AT BEDTIME
Refills: 0 | Status: DISCONTINUED | OUTPATIENT
Start: 2025-05-23 | End: 2025-05-24

## 2025-05-23 RX ORDER — SIMETHICONE 80 MG
1 TABLET,CHEWABLE ORAL
Qty: 0 | Refills: 0 | DISCHARGE
Start: 2025-05-23

## 2025-05-23 RX ORDER — SENNA 187 MG
2 TABLET ORAL AT BEDTIME
Refills: 0 | Status: DISCONTINUED | OUTPATIENT
Start: 2025-05-23 | End: 2025-05-23

## 2025-05-23 RX ORDER — ACETAMINOPHEN 500 MG/5ML
3 LIQUID (ML) ORAL
Qty: 0 | Refills: 0 | DISCHARGE
Start: 2025-05-23

## 2025-05-23 RX ADMIN — Medication 600 MILLIGRAM(S): at 00:00

## 2025-05-23 RX ADMIN — Medication 600 MILLIGRAM(S): at 06:03

## 2025-05-23 RX ADMIN — Medication 600 MILLIGRAM(S): at 05:31

## 2025-05-23 RX ADMIN — Medication 600 MILLIGRAM(S): at 11:33

## 2025-05-23 RX ADMIN — Medication 600 MILLIGRAM(S): at 23:34

## 2025-05-23 RX ADMIN — Medication 600 MILLIGRAM(S): at 16:44

## 2025-05-23 RX ADMIN — Medication 975 MILLIGRAM(S): at 21:19

## 2025-05-23 RX ADMIN — Medication 975 MILLIGRAM(S): at 09:14

## 2025-05-23 RX ADMIN — Medication 600 MILLIGRAM(S): at 17:16

## 2025-05-23 RX ADMIN — Medication 975 MILLIGRAM(S): at 02:59

## 2025-05-23 RX ADMIN — Medication 975 MILLIGRAM(S): at 10:10

## 2025-05-23 RX ADMIN — Medication 975 MILLIGRAM(S): at 14:13

## 2025-05-23 RX ADMIN — Medication 975 MILLIGRAM(S): at 20:26

## 2025-05-23 RX ADMIN — Medication 975 MILLIGRAM(S): at 02:24

## 2025-05-23 RX ADMIN — Medication 975 MILLIGRAM(S): at 15:10

## 2025-05-23 RX ADMIN — Medication 1 TABLET(S): at 11:33

## 2025-05-23 RX ADMIN — Medication 600 MILLIGRAM(S): at 12:00

## 2025-05-23 RX ADMIN — Medication 2 TABLET(S): at 22:49

## 2025-05-24 VITALS
HEART RATE: 83 BPM | SYSTOLIC BLOOD PRESSURE: 119 MMHG | TEMPERATURE: 98 F | DIASTOLIC BLOOD PRESSURE: 71 MMHG | RESPIRATION RATE: 18 BRPM | OXYGEN SATURATION: 97 %

## 2025-05-24 PROCEDURE — 85025 COMPLETE CBC W/AUTO DIFF WBC: CPT

## 2025-05-24 PROCEDURE — 59050 FETAL MONITOR W/REPORT: CPT

## 2025-05-24 PROCEDURE — 86900 BLOOD TYPING SEROLOGIC ABO: CPT

## 2025-05-24 PROCEDURE — 86780 TREPONEMA PALLIDUM: CPT

## 2025-05-24 PROCEDURE — 86850 RBC ANTIBODY SCREEN: CPT

## 2025-05-24 PROCEDURE — 82962 GLUCOSE BLOOD TEST: CPT

## 2025-05-24 PROCEDURE — 86901 BLOOD TYPING SEROLOGIC RH(D): CPT

## 2025-05-24 RX ADMIN — Medication 975 MILLIGRAM(S): at 02:21

## 2025-05-24 RX ADMIN — Medication 600 MILLIGRAM(S): at 00:02

## 2025-05-24 RX ADMIN — Medication 600 MILLIGRAM(S): at 05:31

## 2025-05-24 RX ADMIN — Medication 975 MILLIGRAM(S): at 08:43

## 2025-05-24 RX ADMIN — Medication 975 MILLIGRAM(S): at 03:00

## 2025-05-24 RX ADMIN — Medication 600 MILLIGRAM(S): at 06:17

## 2025-05-24 RX ADMIN — Medication 975 MILLIGRAM(S): at 09:30
